# Patient Record
Sex: FEMALE | Race: WHITE | NOT HISPANIC OR LATINO | Employment: FULL TIME | URBAN - METROPOLITAN AREA
[De-identification: names, ages, dates, MRNs, and addresses within clinical notes are randomized per-mention and may not be internally consistent; named-entity substitution may affect disease eponyms.]

---

## 2023-02-09 ENCOUNTER — OFFICE VISIT (OUTPATIENT)
Dept: URGENT CARE | Facility: URGENT CARE | Age: 28
End: 2023-02-09
Payer: COMMERCIAL

## 2023-02-09 VITALS
SYSTOLIC BLOOD PRESSURE: 113 MMHG | TEMPERATURE: 97.9 F | OXYGEN SATURATION: 98 % | DIASTOLIC BLOOD PRESSURE: 73 MMHG | HEART RATE: 68 BPM | WEIGHT: 186 LBS | BODY MASS INDEX: 32.55 KG/M2

## 2023-02-09 DIAGNOSIS — R07.0 THROAT PAIN: Primary | ICD-10-CM

## 2023-02-09 LAB
DEPRECATED S PYO AG THROAT QL EIA: NEGATIVE
GROUP A STREP BY PCR: NOT DETECTED

## 2023-02-09 PROCEDURE — 87651 STREP A DNA AMP PROBE: CPT | Performed by: PHYSICIAN ASSISTANT

## 2023-02-09 PROCEDURE — 99203 OFFICE O/P NEW LOW 30 MIN: CPT | Performed by: PHYSICIAN ASSISTANT

## 2023-02-09 RX ORDER — HYDROXYZINE HYDROCHLORIDE 10 MG/1
TABLET, FILM COATED ORAL
COMMUNITY
Start: 2022-08-30 | End: 2023-06-08

## 2023-02-09 RX ORDER — FAMOTIDINE 20 MG/1
1 TABLET, FILM COATED ORAL
COMMUNITY
Start: 2022-12-11 | End: 2023-03-22

## 2023-02-09 RX ORDER — DULOXETIN HYDROCHLORIDE 30 MG/1
30 CAPSULE, DELAYED RELEASE ORAL DAILY
COMMUNITY
Start: 2023-01-27 | End: 2023-06-08

## 2023-02-09 ASSESSMENT — ENCOUNTER SYMPTOMS
ABDOMINAL PAIN: 0
SORE THROAT: 1
RHINORRHEA: 0
VOMITING: 0
DIARRHEA: 0
COUGH: 0
FEVER: 0

## 2023-02-09 NOTE — PROGRESS NOTES
Assessment & Plan:        ICD-10-CM    1. Throat pain  R07.0 Streptococcus A Rapid Screen w/Reflex to PCR - Clinic Collect     Group A Streptococcus PCR Throat Swab            Plan/Clinical Decision Making:    Patient with acute ST, erythematous throat with exudates.   RST negative.   Rest, fluids. Due to appearance of throat, return to work if PCR negative.   Tylenol/ibuporfen as needed.   Offered covid test, she will monitor symptoms and have test as needed.         Return if symptoms worsen or fail to improve, for in 3-5 days.     At the end of the encounter, I discussed results, diagnosis, medications. Discussed red flags for immediate return to clinic/ER, as well as indications for follow up if no improvement. Patient understood and agreed to plan. Patient was stable for discharge.        Emily Palomraes PA-C on 2/9/2023 at 1:21 PM          Subjective:     HPI:    Fela is a 27 year old female who presents to clinic today for the following health issues:  Chief Complaint   Patient presents with     Urgent Care     Last night throat pain, tonsils swollen, getting worse,   Taking ibuprofen      HPI    Patient complains of ST, swollen tonsils.   Taking ibuprofen.   No covid testing done.     History obtained from the patient.    Review of Systems   Constitutional: Negative for fever.   HENT: Positive for sore throat. Negative for congestion and rhinorrhea.    Respiratory: Negative for cough.    Gastrointestinal: Negative for abdominal pain, diarrhea and vomiting.         Patient Active Problem List   Diagnosis     Other and unspecified nonspecific immunological findings     Polyarticular arthritis     Minocycline adverse reaction        Past Medical History:   Diagnosis Date     Acne     on minocycline 10/2012-1/2014     Allergic rhinitis      Headache(784.0)     tension-type by description     Scoliosis     TLSO bracing for 3 years       Social History     Tobacco Use     Smoking status: Never     Smokeless  tobacco: Never   Substance Use Topics     Alcohol use: Not on file             Objective:     Vitals:    02/09/23 1309   BP: 113/73   Pulse: 68   Temp: 97.9  F (36.6  C)   TempSrc: Oral   SpO2: 98%   Weight: 84.4 kg (186 lb)         Physical Exam   EXAM:   Pleasant, alert, appropriate appearance. NAD.  Head Exam: Normocephalic, atraumatic.  Eye Exam:  PERRLA, EOMI, non icteric/injection.    Ear Exam: TMs grey without bulging. Normal canals.  Normal pinna.  Nose Exam: Normal external nose.    OroPharynx Exam:  Moist mucous membranes. positive erythema, pharynx with exudate, right tonsillar hypertrophy.  Neck/Thyroid Exam:  No LAD.    Chest/Respiratory Exam: CTAB.  Cardiovascular Exam: RRR. No murmur or rubs.      Results:  Results for orders placed or performed in visit on 02/09/23   Streptococcus A Rapid Screen w/Reflex to PCR - Clinic Collect     Status: Normal    Specimen: Throat; Swab   Result Value Ref Range    Group A Strep antigen Negative Negative

## 2023-03-20 ASSESSMENT — ANXIETY QUESTIONNAIRES
IF YOU CHECKED OFF ANY PROBLEMS ON THIS QUESTIONNAIRE, HOW DIFFICULT HAVE THESE PROBLEMS MADE IT FOR YOU TO DO YOUR WORK, TAKE CARE OF THINGS AT HOME, OR GET ALONG WITH OTHER PEOPLE: SOMEWHAT DIFFICULT
4. TROUBLE RELAXING: SEVERAL DAYS
2. NOT BEING ABLE TO STOP OR CONTROL WORRYING: SEVERAL DAYS
5. BEING SO RESTLESS THAT IT IS HARD TO SIT STILL: SEVERAL DAYS
6. BECOMING EASILY ANNOYED OR IRRITABLE: NOT AT ALL
7. FEELING AFRAID AS IF SOMETHING AWFUL MIGHT HAPPEN: SEVERAL DAYS
7. FEELING AFRAID AS IF SOMETHING AWFUL MIGHT HAPPEN: SEVERAL DAYS
1. FEELING NERVOUS, ANXIOUS, OR ON EDGE: SEVERAL DAYS
3. WORRYING TOO MUCH ABOUT DIFFERENT THINGS: SEVERAL DAYS
8. IF YOU CHECKED OFF ANY PROBLEMS, HOW DIFFICULT HAVE THESE MADE IT FOR YOU TO DO YOUR WORK, TAKE CARE OF THINGS AT HOME, OR GET ALONG WITH OTHER PEOPLE?: SOMEWHAT DIFFICULT
GAD7 TOTAL SCORE: 6
GAD7 TOTAL SCORE: 6

## 2023-03-20 ASSESSMENT — ENCOUNTER SYMPTOMS
COUGH: 0
NERVOUS/ANXIOUS: 1
JOINT SWELLING: 0
DIZZINESS: 0
FREQUENCY: 0
FEVER: 0
HEARTBURN: 1
HEMATURIA: 0
CHILLS: 0
DYSURIA: 0
BREAST MASS: 0
HEADACHES: 0
ABDOMINAL PAIN: 0
PARESTHESIAS: 0
MYALGIAS: 0
NAUSEA: 1
SHORTNESS OF BREATH: 0
SORE THROAT: 0
CONSTIPATION: 1
EYE PAIN: 0
WEAKNESS: 0
PALPITATIONS: 0
DIARRHEA: 0
HEMATOCHEZIA: 0
ARTHRALGIAS: 0

## 2023-03-21 ENCOUNTER — OFFICE VISIT (OUTPATIENT)
Dept: OBGYN | Facility: CLINIC | Age: 28
End: 2023-03-21
Attending: NURSE PRACTITIONER
Payer: COMMERCIAL

## 2023-03-21 VITALS
SYSTOLIC BLOOD PRESSURE: 118 MMHG | WEIGHT: 186 LBS | HEART RATE: 80 BPM | BODY MASS INDEX: 34.23 KG/M2 | DIASTOLIC BLOOD PRESSURE: 80 MMHG | HEIGHT: 62 IN

## 2023-03-21 DIAGNOSIS — R10.2 PELVIC PAIN IN FEMALE: ICD-10-CM

## 2023-03-21 DIAGNOSIS — Z30.431 INTRAUTERINE DEVICE SURVEILLANCE: ICD-10-CM

## 2023-03-21 DIAGNOSIS — Z01.419 ENCOUNTER FOR GYNECOLOGICAL EXAMINATION WITHOUT ABNORMAL FINDING: Primary | ICD-10-CM

## 2023-03-21 PROCEDURE — G0101 CA SCREEN;PELVIC/BREAST EXAM: HCPCS | Performed by: NURSE PRACTITIONER

## 2023-03-21 PROCEDURE — 99203 OFFICE O/P NEW LOW 30 MIN: CPT | Mod: 25 | Performed by: NURSE PRACTITIONER

## 2023-03-21 PROCEDURE — G0463 HOSPITAL OUTPT CLINIC VISIT: HCPCS | Performed by: NURSE PRACTITIONER

## 2023-03-21 RX ORDER — LEVONORGESTREL 52 MG/1
1 INTRAUTERINE DEVICE INTRAUTERINE
COMMUNITY
Start: 2021-06-01

## 2023-03-21 ASSESSMENT — PAIN SCALES - GENERAL: PAINLEVEL: NO PAIN (0)

## 2023-03-21 NOTE — LETTER
"3/21/2023       RE: Fela Solorzano  [address]  [place] [address]     Dear Colleague,    Thank you for referring your patient, Fela Solorzano, to the [facility] [place] at [facility]. Please see a copy of my visit note below.    -  Progress Note    SUBJECTIVE:  Fela Solorzano is an 27 year old, , who requests a breast and pelvic exam.    Patient is followed by Shannan Valentine for primary care.    Concerns today include:     1. Pelvic pain: She reports chronic intermittent symptoms of sharp cramping pain and spotting with exercise that comes and goes.  She also endorses post-coital bleeding, dyspareunia, bloating and constipation.Takes Doculax PRN. Per patient, Mirena IUD was placed in  to help with painful periods. Previous provider thought possible endometriosis vs IBS.  Since IUD placement, patient feels \"pain is better in severity but more frequent.\" Pain comes and goes throughout the day; unable to identify patterns. Ranges from a 3/10 -6/10 in intensity. It does interfere with her daily activities.  Denies pelvic or rectal pressure. No known fibroids.     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 Feels safe. Recently moved from [place].      Exercise/ diet: exercises at least 3 x a week, nutrition is ok. Some calcium rich foods.      Sexual hx: denies sexual concerns other than pain noted above; declines STD testing.     Menstrual History:  Menstrual History 10/8/2014 3/21/2023   LAST MENSTRUAL PERIOD 2014 -   Menarche Age - 14   Period Cycle (Days) - alka period on iud   Method of Contraception - Mirena IUD   Reviewed Today - Yes       Last Pap smear: 3/25/2021   History of abnormal Pap smear: NO - age 21-29 PAP every 3 years recommended    Mammogram current: not applicable    HISTORY:  Prescription Medications as of 3/29/2023       Rx Number Disp Refills Start " End Last Dispensed Date Next Fill Date Owning Pharmacy    DULoxetine (CYMBALTA) 30 MG capsule    2023        Sig: Take 30 mg by mouth daily    Class: Historical    Route: Oral    famotidine (PEPCID) 20 MG tablet  180 tablet 3 3/22/2023    [facility] [id number] [place] [address]    Sig: Take 1 tablet (20 mg) by mouth 2 times daily    Class: E-Prescribe    Route: Oral    hydrOXYzine (ATARAX) 10 MG tablet    2022        Class: Historical    levonorgestrel (MIRENA, 52 MG,) 20 MCG/DAY IUD    2021        Si each by Intrauterine route    Class: Historical    Route: Intrauterine    minoxidil (LONITEN) 10 MG tablet  60 tablet 0 3/22/2023    [facility] [id number] [place] [address]    Sig: Take 1/2 tablet once daily    Class: E-Prescribe        Allergies   Allergen Reactions     Minocycline Other (See Comments) and Muscle Pain (Myalgia)     Other reaction(s): Arthralgia  HUT Reaction: Arthralgia    Other reaction(s): Arthralgia       Immunization History   Administered Date(s) Administered     COVID-19 Vaccine 18+ (Moderna) 2020     COVID-19 Vaccine Bivalent Booster 12+ (Pfizer) 2022       OB History    Para Term  AB Living   0 0 0 0 0 0   SAB IAB Ectopic Multiple Live Births   0 0 0 0 0     Past Medical History:   Diagnosis Date     Acne     on minocycline 10/2012-2014     Allergic rhinitis      Depressive disorder      Headache(784.0)     tension-type by description     Scoliosis     TLSO bracing for 3 years     History reviewed. No pertinent surgical history.  Family History   Problem Relation Age of Onset     Depression Mother      Multiple Sclerosis Father      Thyroid Disease Sister      Depression Sister      Depression Sister      SLE Maternal Grandfather      Crohn's Disease Paternal Grandmother      Arthritis Paternal Grandmother      Social History     Socioeconomic History     Marital status: Single     Spouse name: None      "Number of children: None     Years of education: None     Highest education level: None   Tobacco Use     Smoking status: Never     Smokeless tobacco: Never   Vaping Use     Vaping Use: Never used   Substance and Sexual Activity     Alcohol use: Yes     Comment: 1-2 times a month     Drug use: Never     Sexual activity: Yes     Partners: Male     Birth control/protection: I.U.D.       ROS    EXAM:  Blood pressure 118/80, pulse 80, height 1.58 m (5' 2.21\"), weight 84.4 kg (186 lb). Body mass index is 33.8 kg/m .  General appearance: Pleasant female in no acute distress.     BREAST EXAM:  Breast: Without visible skin changes. No dimpling or lesions seen.   Breasts supple, non-tender with palpation, no dominant mass, nodularity, or nipple discharge noted bilaterally. Axillary nodes negative.      PELVIC EXAM:  EG/BUS: Normal genital architecture without lesions, erythema or abnormal secretions; Bartholin's, Urethra, [place]'s normal   Urethral meatus: normal   Urethra: no masses, tenderness, or scarring  Bladder: no masses or tenderness   Vagina: moist, pink, rugae with creamy, white and odorless secretions  Cervix: pink, moist, closed, without lesion; IUD strings extend from cervical os   Uterus: anteverted, and small, smooth, firm, mobile w/o pain  Adnexa: Within normal limits and No masses, nodularity, tenderness    ASSESSMENT:  Encounter Diagnosis   Name Primary?     Pelvic pain in female Yes        PLAN:   Orders Placed This Encounter   Procedures     US Transvaginal Non OB       Plan  1. Breast and pelvic exams without significant findings today. Next pap smear due 3/2024.  2. TV US ordered to assess for correct IUD placement and for structural cause of pain. Counseled pt that endometriosis cannot be diagnosed by ultrasound; confirmation can only be determined by laparoscopy, although there may be signs on ultrasound if lesions on the ovaries. May consider further medical management for endometriosis management, if " no other significant findings on US to change the plan for care.   3. Recommend follow-up visit with GI specialist    Pt will schedule follow-up visit with OB/Gyn to review TV US results and discuss option of laparoscopy for further evaluation of chronic pelvic pain.      Return in one year/PRN for preventive care or problems/concerns.     Verbalized understanding and agreement with visit plan.    I, Maritza Rankin, completed the PFSH and ROS. I then acted as a scribe for PERICO Saleem, for the remainder of the visit.  Maritza Rankin, JDUYN RN NATALIENP DNP Student    I agree with the PFSH and ROS as completed by the PERICO Student, except for changes made by me.  The remainder of the encounter was performed by me and scribed by the PERICO Student.  The scribed note accurately reflects my personal services and decisions made by me.  Jessica Nazario DNP, APRN, PERICO

## 2023-03-21 NOTE — PROGRESS NOTES
"-  Progress Note    SUBJECTIVE:  Fela Solorzano is an 27 year old, , who requests a breast and pelvic exam.    Patient is followed by Shannan Valentine for primary care.    Concerns today include:     1. Pelvic pain: She reports chronic intermittent symptoms of sharp cramping pain and spotting with exercise that comes and goes.  She also endorses post-coital bleeding, dyspareunia, bloating and constipation.Takes Doculax PRN. Per patient, Mirena IUD was placed in  to help with painful periods. Previous provider thought possible endometriosis vs IBS.  Since IUD placement, patient feels \"pain is better in severity but more frequent.\" Pain comes and goes throughout the day; unable to identify patterns. Ranges from a 3/10 -6/10 in intensity. It does interfere with her daily activities.  Denies pelvic or rectal pressure. No known fibroids.     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      Exercise/ diet: exercises at least 3 x a week, nutrition is ok. Some calcium rich foods.      Sexual hx: denies sexual concerns other than pain noted above; declines STD testing.     Menstrual History:  Menstrual History 10/8/2014 3/21/2023   LAST MENSTRUAL PERIOD 2014 -   Menarche Age - 14   Period Cycle (Days) - alka period on iud   Method of Contraception - Mirena IUD   Reviewed Today - Yes       Last Pap smear: 3/25/2021   History of abnormal Pap smear: NO - age 21-29 PAP every 3 years recommended    Mammogram current: not applicable    HISTORY:  Prescription Medications as of 3/29/2023       Rx Number Disp Refills Start End Last Dispensed Date Next Fill Date Owning Pharmacy    DULoxetine (CYMBALTA) 30 MG capsule    2023        Sig: Take 30 mg by mouth daily    Class: Historical    Route: Oral    famotidine (PEPCID) 20 MG tablet  180 tablet 3 3/22/2023    [facility] [id number] [facility] - [place], [place] - [address]    Sig: Take 1 " tablet (20 mg) by mouth 2 times daily    Class: E-Prescribe    Route: Oral    hydrOXYzine (ATARAX) 10 MG tablet    2022        Class: Historical    levonorgestrel (MIRENA, 52 MG,) 20 MCG/DAY IUD    2021        Si each by Intrauterine route    Class: Historical    Route: Intrauterine    minoxidil (LONITEN) 10 MG tablet  60 tablet 0 3/22/2023    [id number] - [place], [place] - [address]    Sig: Take 1/2 tablet once daily    Class: E-Prescribe        Allergies   Allergen Reactions     Minocycline Other (See Comments) and Muscle Pain (Myalgia)     Other reaction(s): Arthralgia  HUT Reaction: Arthralgia    Other reaction(s): Arthralgia       Immunization History   Administered Date(s) Administered     COVID-19 Vaccine 18+ (Moderna) 2020     COVID-19 Vaccine Bivalent Booster 12+ (Pfizer) 2022       OB History    Para Term  AB Living   0 0 0 0 0 0   SAB IAB Ectopic Multiple Live Births   0 0 0 0 0     Past Medical History:   Diagnosis Date     Acne     on minocycline 10/2012-2014     Allergic rhinitis      Depressive disorder      Headache(784.0)     tension-type by description     Scoliosis     TLSO bracing for 3 years     History reviewed. No pertinent surgical history.  Family History   Problem Relation Age of Onset     Depression Mother      Multiple Sclerosis Father      Thyroid Disease Sister      Depression Sister      Depression Sister      SLE Maternal Grandfather      Crohn's Disease Paternal Grandmother      Arthritis Paternal Grandmother      Social History     Socioeconomic History     Marital status: Single     Spouse name: None     Number of children: None     Years of education: None     Highest education level: None   Tobacco Use     Smoking status: Never     Smokeless tobacco: Never   Vaping Use     Vaping Use: Never used   Substance and Sexual Activity     Alcohol use: Yes     Comment: 1-2 times a month     Drug use: Never     Sexual activity:  "Yes     Partners: Male     Birth control/protection: I.U.D.       ROS    EXAM:  Blood pressure 118/80, pulse 80, height 1.58 m (5' 2.21\"), weight 84.4 kg (186 lb). Body mass index is 33.8 kg/m .  General appearance: Pleasant female in no acute distress.     BREAST EXAM:  Breast: Without visible skin changes. No dimpling or lesions seen.   Breasts supple, non-tender with palpation, no dominant mass, nodularity, or nipple discharge noted bilaterally. Axillary nodes negative.      PELVIC EXAM:  EG/BUS: Normal genital architecture without lesions, erythema or abnormal secretions; Bartholin's, Urethra, [place]'s normal   Urethral meatus: normal   Urethra: no masses, tenderness, or scarring  Bladder: no masses or tenderness   Vagina: moist, pink, rugae with creamy, white and odorless secretions  Cervix: pink, moist, closed, without lesion; IUD strings extend from cervical os   Uterus: anteverted, and small, smooth, firm, mobile w/o pain  Adnexa: Within normal limits and No masses, nodularity, tenderness    ASSESSMENT:  Encounter Diagnosis   Name Primary?     Pelvic pain in female Yes        PLAN:   Orders Placed This Encounter   Procedures     US Transvaginal Non OB       Plan  1. Breast and pelvic exams without significant findings today. Next pap smear due 3/2024.  2. TV US ordered to assess for correct IUD placement and for structural cause of pain. Counseled pt that endometriosis cannot be diagnosed by ultrasound; confirmation can only be determined by laparoscopy, although there may be signs on ultrasound if lesions on the ovaries. May consider further medical management for endometriosis management, if no other significant findings on US to change the plan for care.   3. Recommend follow-up visit with GI specialist    Pt will schedule follow-up visit with OB/Gyn to review TV US results and discuss option of laparoscopy for further evaluation of chronic pelvic pain.      Return in one year/PRN for preventive care or " problems/concerns.     Verbalized understanding and agreement with visit plan.    I, Maritza Rankin, completed the PFSH and ROS. I then acted as a scribe for PERICO Saleem, for the remainder of the visit.  JUDY MarxN RN NATALIENP DNP Student    I agree with the PFSH and ROS as completed by the PERICO Student, except for changes made by me.  The remainder of the encounter was performed by me and scribed by the PERICO Student.  The scribed note accurately reflects my personal services and decisions made by me.  Jessica Nazario DNP, APRN, PERICO

## 2023-03-22 ENCOUNTER — OFFICE VISIT (OUTPATIENT)
Dept: FAMILY MEDICINE | Facility: CLINIC | Age: 28
End: 2023-03-22
Payer: COMMERCIAL

## 2023-03-22 VITALS
WEIGHT: 187 LBS | OXYGEN SATURATION: 98 % | RESPIRATION RATE: 16 BRPM | SYSTOLIC BLOOD PRESSURE: 110 MMHG | BODY MASS INDEX: 33.13 KG/M2 | DIASTOLIC BLOOD PRESSURE: 72 MMHG | TEMPERATURE: 98.4 F | HEART RATE: 73 BPM | HEIGHT: 63 IN

## 2023-03-22 DIAGNOSIS — K21.00 GASTROESOPHAGEAL REFLUX DISEASE WITH ESOPHAGITIS WITHOUT HEMORRHAGE: ICD-10-CM

## 2023-03-22 DIAGNOSIS — E55.9 VITAMIN D DEFICIENCY: Primary | ICD-10-CM

## 2023-03-22 DIAGNOSIS — Z78.9 VEGETARIAN DIET: ICD-10-CM

## 2023-03-22 DIAGNOSIS — Z11.4 SCREENING FOR HIV (HUMAN IMMUNODEFICIENCY VIRUS): ICD-10-CM

## 2023-03-22 DIAGNOSIS — Z00.00 HEALTH CARE MAINTENANCE: ICD-10-CM

## 2023-03-22 DIAGNOSIS — Z11.59 NEED FOR HEPATITIS C SCREENING TEST: ICD-10-CM

## 2023-03-22 DIAGNOSIS — R41.840 INATTENTION: ICD-10-CM

## 2023-03-22 DIAGNOSIS — L65.9 ALOPECIA: ICD-10-CM

## 2023-03-22 DIAGNOSIS — Z12.4 CERVICAL CANCER SCREENING: ICD-10-CM

## 2023-03-22 PROCEDURE — 99213 OFFICE O/P EST LOW 20 MIN: CPT | Mod: 25 | Performed by: NURSE PRACTITIONER

## 2023-03-22 PROCEDURE — 99395 PREV VISIT EST AGE 18-39: CPT | Performed by: NURSE PRACTITIONER

## 2023-03-22 RX ORDER — FAMOTIDINE 20 MG/1
20 TABLET, FILM COATED ORAL
Qty: 180 TABLET | Refills: 3 | Status: SHIPPED | OUTPATIENT
Start: 2023-03-22 | End: 2024-09-17

## 2023-03-22 RX ORDER — MINOXIDIL 10 MG/1
TABLET ORAL
Qty: 60 TABLET | Refills: 0 | Status: SHIPPED | OUTPATIENT
Start: 2023-03-22 | End: 2023-06-23

## 2023-03-22 ASSESSMENT — ENCOUNTER SYMPTOMS
ARTHRALGIAS: 0
NAUSEA: 1
SORE THROAT: 0
HEMATURIA: 0
EYE PAIN: 0
SHORTNESS OF BREATH: 0
ABDOMINAL PAIN: 0
CONSTIPATION: 1
MYALGIAS: 0
PARESTHESIAS: 0
FREQUENCY: 0
DIARRHEA: 0
FEVER: 0
NERVOUS/ANXIOUS: 1
JOINT SWELLING: 0
PALPITATIONS: 0
WEAKNESS: 0
HEMATOCHEZIA: 0
HEADACHES: 0
DIZZINESS: 0
DYSURIA: 0
HEARTBURN: 1
CHILLS: 0
BREAST MASS: 0
COUGH: 0

## 2023-03-22 NOTE — PROGRESS NOTES
SUBJECTIVE:   CC: Fela is an 27 year old who presents for preventive health visit.   No flowsheet data found.Patient has been advised of split billing requirements and indicates understanding: Yes  ADHD  Onset: highschool and college   Description:   Easily distracted: YES  Short attention span: YES  Trouble following directions: No   Impulsive behavior: YES   Trouble completing tasks: No  Accompanying Signs & Symptoms:        Change in sleep pattern: no  Irritability at certain times of the day: yes irritable in general  Socially withdrawn: No  Depression symptoms: YES  Anxiety symptoms: YES  History:  Caffeine intake: varying amounts, coffee  Loss of appetite: YES, challenges with get herself food  Healthy diet: YES  Did you have problems in school or with previous employment: No  Family history of ADHD: YES  Have you had an evaluation for ADHD in the past: YES  Do you use alcohol or drugs: No  Therapies tried: medication not used     Healthy Habits:     Getting at least 3 servings of Calcium per day:  Yes    Bi-annual eye exam:  Yes    Dental care twice a year:  NO    Sleep apnea or symptoms of sleep apnea:  Daytime drowsiness    Diet:  Vegetarian/vegan    Frequency of exercise:  2-3 days/week    Duration of exercise:  45-60 minutes    Taking medications regularly:  Yes    Barriers to taking medications:  None    Medication side effects:  None    PHQ-2 Total Score: 2    Additional concerns today:  Yes      Hair loss and questions about oral minodixil.Hair loss since college; has patches in multiple areas on her head.     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 Seeing a psychotherapist to help with managing  Sleep: working on developing a nighttime routine at night 6-7 hour, night person:  6:30am  Alcohol: couple drinks/month  Tobacco: none  Drugs: no    Family history: CAD, MI, CVS, no breast cancer, colon cancer, DMT2  PGF Lupus  MGM: crohns  Mother: fibromyalgia   Father MS    Today's PHQ-2 Score:   PHQ-2 ( 1999 [employer]) 3/21/2023   Q1: Little interest or pleasure in doing things 2   Q2: Feeling down, depressed or hopeless 0   PHQ-2 Score 2   Q1: Little interest or pleasure in doing things -   Q2: Feeling down, depressed or hopeless -   PHQ-2 Score -       Have you ever done Advance Care Planning? (For example, a Health Directive, POLST, or a discussion with a medical provider or your loved ones about your wishes): No, advance care planning information given to patient to review.  Patient declined advance care planning discussion at this time.    Social History     Tobacco Use     Smoking status: Never     Smokeless tobacco: Never   Substance Use Topics     Alcohol use: Yes     Comment: 1-2 times a month       Alcohol Use 3/20/2023   Prescreen: >3 drinks/day or >7 drinks/week? No   No flowsheet data found.  Reviewed orders with patient.  Reviewed health maintenance and updated orders accordingly - Yes  Lab work is in process    Breast Cancer Screening:    Breast CA Risk Assessment (FHS-7) 3/20/2023   Do you have a family history of breast, colon, or ovarian cancer? No / Unknown         Pertinent mammograms are reviewed under the imaging tab.    History of abnormal Pap smear: NO - age 21-29 PAP every 3 years recommended     Reviewed and updated as needed this visit by clinical staff    Allergies  Meds              Reviewed and updated as needed this visit by Provider                 Past Medical History:   Diagnosis Date     Acne     on minocycline 10/2012-1/2014     Allergic rhinitis      Depressive disorder      Headache(784.0)     tension-type by description     Scoliosis     TLSO bracing for 3 years      No past surgical history on file.    Review of Systems   Constitutional: Negative for  "chills and fever.   HENT: Negative for congestion, ear pain, hearing loss and sore throat.    Eyes: Negative for pain and visual disturbance.   Respiratory: Negative for cough and shortness of breath.    Cardiovascular: Negative for chest pain, palpitations and peripheral edema.   Gastrointestinal: Positive for constipation, heartburn and nausea. Negative for abdominal pain, diarrhea and hematochezia.   Breasts:  Negative for tenderness, breast mass and discharge.   Genitourinary: Positive for pelvic pain and vaginal bleeding. Negative for dysuria, frequency, genital sores, hematuria, urgency and vaginal discharge.   Musculoskeletal: Negative for arthralgias, joint swelling and myalgias.   Skin: Negative for rash.   Neurological: Negative for dizziness, weakness, headaches and paresthesias.   Psychiatric/Behavioral: Negative for mood changes. The patient is nervous/anxious.           OBJECTIVE:   /72 (BP Location: Right arm, Patient Position: Sitting, Cuff Size: Adult Large)   Pulse 73   Temp 98.4  F (36.9  C) (Tympanic)   Resp 16   Ht 1.6 m (5' 3\")   Wt 84.8 kg (187 lb)   LMP  (LMP Unknown)   SpO2 98%   BMI 33.13 kg/m    Physical Exam  Constitutional:       Appearance: Normal appearance.   HENT:      Head: Normocephalic.      Right Ear: Tympanic membrane, ear canal and external ear normal.      Left Ear: Tympanic membrane, ear canal and external ear normal.      Nose: Nose normal.      Mouth/Throat:      Mouth: Mucous membranes are moist.      Pharynx: Oropharynx is clear.      Tonsils: 2+ on the right. 2+ on the left.   Eyes:      Extraocular Movements: Extraocular movements intact.      Conjunctiva/sclera: Conjunctivae normal.      Pupils: Pupils are equal, round, and reactive to light.   Neck:      Thyroid: No thyroid mass, thyromegaly or thyroid tenderness.      Trachea: Trachea normal.   Cardiovascular:      Rate and Rhythm: Normal rate and regular rhythm.      Heart sounds: Normal heart sounds. "   Pulmonary:      Effort: Pulmonary effort is normal.      Breath sounds: Normal breath sounds.   Abdominal:      General: Abdomen is flat. Bowel sounds are normal.   Musculoskeletal:         General: Normal range of motion.      Cervical back: Normal range of motion.   Lymphadenopathy:      Cervical: No cervical adenopathy.   Skin:     General: Skin is warm and dry.   Neurological:      Mental Status: She is alert and oriented to person, place, and time.   Psychiatric:         Mood and Affect: Mood normal.         Behavior: Behavior normal.         Thought Content: Thought content normal.         Judgment: Judgment normal.         ASSESSMENT/PLAN:     Problem List Items Addressed This Visit        Digestive    Gastroesophageal reflux disease with esophagitis without hemorrhage     Uses famotidine 20 mg as needed for reflux symptoms         Relevant Medications    famotidine (PEPCID) 20 MG tablet       Musculoskeletal and Integumentary    Alopecia     Patchy, right side hairline pulled back and thinned hair, has an area in the middle of the top of her head. Has seen a dermatologist in the past who told her it was fine. 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 The minoxidil solution will not work her as she has to wear the cap all day. Will do lab testing, TSH, testosterone, iron, ferritin.  Minoxidil 5 mg daily, monitor BP         Relevant Medications    minoxidil (LONITEN) 10 MG tablet    Other Relevant Orders    Testosterone total    TSH with free T4 reflex    Iron and iron binding capacity    Ferritin       Other    Health care maintenance     Pap smear was done with her OB/GYN  Influenza was done last fall at her last job.    Contraception is Mirena    Family history: CAD, MI, CVS, no breast cancer, colon cancer, DMT2  PGF Lupus  MGM: crohns  Mother: fibromyalgia   Father MS           Inattention     Feels like she has many of the symptoms of inattention. Would like neuropsych  "testing to assess it further. neuropsych testing referral placed.          Relevant Orders    Adult Mental Health  Referral    Vegetarian diet     primarily vegetarian but some fish eggs and dairy. Will test vitamin B12. Overall doesn't eat a lot of processed foods.         Relevant Orders    Vitamin B12   Other Visit Diagnoses     Vitamin D deficiency    -  Primary    Relevant Orders    Vitamin D Deficiency    Screening for HIV (human immunodeficiency virus)        Need for hepatitis C screening test        Cervical cancer screening              Patient has been advised of split billing requirements and indicates understanding: Yes      COUNSELING:  Reviewed preventive health counseling, as reflected in patient instructions       Regular exercise       Healthy diet/nutrition      BMI:   Estimated body mass index is 33.13 kg/m  as calculated from the following:    Height as of this encounter: 1.6 m (5' 3\").    Weight as of this encounter: 84.8 kg (187 lb).   Weight management plan: Discussed healthy diet and exercise guidelines      She reports that she has never smoked. She has never used smokeless tobacco.      RASHEEDA Love CNP  [facility]  "

## 2023-03-22 NOTE — PATIENT INSTRUCTIONS
Meditation mumtaz  10% Happier  Waking up to Wilmer Lenz    Please start an Omega 3 fatty acid supplement [id number] mg a day, take with food and  keep the supplement in the fridge. Eat foods high in omega 3 fatty acids sardines, salmon, mackerel, herring, flax, avocados, julius seeds, nuts and seeds.     Tumeric/ Curcumin food and supplement  Quercitin food and supplement    Sleep Hygiene can be defined as the practices and habits one can create to promote good sleep on a regular basis.    About Sleep Hygiene  Sleep hygiene refers to the recommended environmental and behavioral practices designed to promote better quality sleep. It was in the late 1970s that this recommendation was first developed as a way of helping people suffering with mild-to-moderate insomnia.    People who present with insomnia, depression, and other conditions, are assessed by clinicians who offer recommendations based on their assessment.    Recommendations for sleep hygiene tips include the following -  Not exercising either mentally or physically too close to bedtime;  Taking naps at appropriate times;  Establishing a regular sleep schedule;  Reducing worry;  Getting out of bed if sleep doesn t come;  Limiting exposure to light prior to sleep;  Using bed only for sex and sleep;  Avoiding stimulant such as alcohol, caffeine and nicotine in the hours before bedtime;  Creating a dark, comfortable, and peaceful sleep environment.    Sleep Hygiene Tips     Sleep Schedule  One of the recommendations for sleep hygiene relates to the timing of sleep, or allowing enough time for sleep. Adults who get less than seven or eight hours  sleep can experience mental and physical health deficits, and that s why this is one of the top sleep hygiene recommendations.    Generally, these hours of sleep must be achieved at night, and not through napping, because even though naps can be useful after sleep deprivation, naps under normal conditions may be detrimental to  nighttime sleep. Interestingly, it s been discovered that the negative effects of daytime naps on performance and sleep depend on timing and duration, with the least disruptive naps being midday naps. There s also a lot of focus on the importance of having a regular sleep schedule and awakening at the same time every morning.    Activities  Exercise is an interesting topic, because it s an activity that can either inhibit or facilitate sleep quality. It s true that people who exercise regularly achieve better quality sleep than those who don t exercise; however, exercising late in the day can delay the onset of sleep. It s also believed that increasing exposure to natural and bright lights during the daytime, whilst avoiding bright lights before bedtime, can help achieve a sleep-wake schedule which aligns with nature s daily light-dark cycle.    It s recommended that the person engage in relaxing activities before bedtime, because activities that reduce cognitive activity and physiological arousal are more conducive to falling asleep. ASMR is one of the most recent relaxing techniques that are being used. On the other hand, people who continue planning or working on important activities prior to bedtime, or even once in bed, may experience difficulty in falling asleep. That s why good sleep hygiene means not worrying or thinking about emotionally upsetting issues prior to bedtime. If a person becomes frustrated while trying to fall asleep, they re advised to get out of bed and do something relaxing, like reading, for a short period of time. When people experience difficulties falling asleep, it suggested that spending less time in bed results in a deeper and more continuous sleep; and that s why clinicians often recommend that people who struggle to fall asleep not use their bed for any activities other than sleep or sex.    Foods and Other Substances  It s now known that a number of foods and other substances can  disturb sleep due to their stimulant effects and/or the disruption they cause to the digestive system. These substances maintain wakefulness by activating neurobiological systems, and that s why sleep hygiene specialists recommend the avoidance of substances such as -  Nicotine; Alcohol; and Caffeine, including energy drinks, coffee, tea, soft drinks, chocolate, and some pain relievers.    Clinicians will discourage the drinking of alcohol close to bedtime because, even though alcohol can initially induce sleepiness, the arousal created by metabolizing alcohol can significantly fragment and disrupt sleep.    In addition, it s believed that smoking before bed reduces sleep quality by reducing the amount of time spent in deep sleep, thus leading to nocturnal restlessness and sleep fragmentation.    Interestingly, disrupted sleep has also been associated with both hunger, and the consumption of a large meal prior to bedtime (which requires a large effort to metabolize). It may be recommended by your clinician that you eat a light snack before bedtime.And finally, sleep interruptions can be prevented by limiting the intake of liquids before bedtime, resulting in less urinations.    Sleep Environment   The ideal sleep environment is cool, quiet, and very dark. It s been shown that continuous sleep can be interrupted by an uncomfortable room temperature, light, and noises. Your sleep specialist may also recommend that you select a comfortable mattress, pillows, and bedding, in addition to removing a visible bedroom clock. This will prevent the sleeper from focusing on time    Source: American Sleep Association   www.american.sleep.association.org

## 2023-03-24 PROBLEM — L65.9 ALOPECIA: Status: ACTIVE | Noted: 2023-03-24

## 2023-03-24 PROBLEM — Z00.00 HEALTH CARE MAINTENANCE: Status: ACTIVE | Noted: 2023-03-24

## 2023-03-24 PROBLEM — Z78.9 VEGETARIAN DIET: Status: ACTIVE | Noted: 2023-03-24

## 2023-03-24 PROBLEM — K21.00 GASTROESOPHAGEAL REFLUX DISEASE WITH ESOPHAGITIS WITHOUT HEMORRHAGE: Status: ACTIVE | Noted: 2023-03-24

## 2023-03-24 PROBLEM — R41.840 INATTENTION: Status: ACTIVE | Noted: 2023-03-24

## 2023-03-24 NOTE — ASSESSMENT & PLAN NOTE
Patchy, right side hairline pulled back and thinned hair, has an area in the middle of the top of her head. Has seen a dermatologist in the past who told her it was fine. 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 The minoxidil solution will not work her as she has to wear the cap all day. Will do lab testing, TSH, testosterone, iron, ferritin.  Minoxidil 5 mg daily, monitor BP

## 2023-03-24 NOTE — ASSESSMENT & PLAN NOTE
primarily vegetarian but some fish eggs and dairy. Will test vitamin B12. Overall doesn't eat a lot of processed foods.

## 2023-03-24 NOTE — ASSESSMENT & PLAN NOTE
Feels like she has many of the symptoms of inattention. Would like neuropsych testing to assess it further. neuropsych testing referral placed.

## 2023-03-24 NOTE — ASSESSMENT & PLAN NOTE
Pap smear was done with her OB/GYN  Influenza was done last fall at her last job.    Contraception is Mirena    Family history: CAD, MI, CVS, no breast cancer, colon cancer, DMT2  PGF Lupus  MGM: crohns  Mother: fibromyalgia   Father MS

## 2023-04-18 ENCOUNTER — HOSPITAL ENCOUNTER (OUTPATIENT)
Dept: ULTRASOUND IMAGING | Facility: CLINIC | Age: 28
Discharge: HOME | End: 2023-04-18
Attending: NURSE PRACTITIONER
Payer: COMMERCIAL

## 2023-04-18 ENCOUNTER — OFFICE VISIT (OUTPATIENT)
Dept: OBGYN | Facility: CLINIC | Age: 28
End: 2023-04-18
Payer: COMMERCIAL

## 2023-04-18 VITALS
WEIGHT: 190 LBS | DIASTOLIC BLOOD PRESSURE: 77 MMHG | HEART RATE: 80 BPM | BODY MASS INDEX: 33.66 KG/M2 | SYSTOLIC BLOOD PRESSURE: 124 MMHG

## 2023-04-18 DIAGNOSIS — R10.2 PELVIC PAIN IN FEMALE: ICD-10-CM

## 2023-04-18 DIAGNOSIS — R10.2 PELVIC PAIN IN FEMALE: Primary | ICD-10-CM

## 2023-04-18 DIAGNOSIS — R14.0 BLOATING: ICD-10-CM

## 2023-04-18 PROCEDURE — 76830 TRANSVAGINAL US NON-OB: CPT | Mod: 26 | Performed by: RADIOLOGY

## 2023-04-18 PROCEDURE — 76856 US EXAM PELVIC COMPLETE: CPT | Mod: 26 | Performed by: RADIOLOGY

## 2023-04-18 PROCEDURE — 76856 US EXAM PELVIC COMPLETE: CPT

## 2023-04-18 PROCEDURE — G0463 HOSPITAL OUTPT CLINIC VISIT: HCPCS | Mod: 25

## 2023-04-18 PROCEDURE — 99213 OFFICE O/P EST LOW 20 MIN: CPT | Mod: GE | Performed by: OBSTETRICS & GYNECOLOGY

## 2023-04-18 ASSESSMENT — PAIN SCALES - GENERAL: PAINLEVEL: MILD PAIN (2)

## 2023-04-18 NOTE — PROGRESS NOTES
"Gynecology Clinic Visit Note  2023    S: Fela Solorzano is a 27 year old  here today for ultrasound follow up.  Patient seen by Jessica Owen CNM on 3/21/2023 for evaluation of pelvic pain.  At this visit, and again today the patient reports a history of intermittent dull lower pelvic pain that radiates into her hips and knees bilaterally.  This pain is notably worse following exercise and she will occasionally have the same pain during intercourse.  She further describes this pain as a \"uterine cramping\".  Currently feels like this pain occurs 3-4 times a week without any known provocation factors.  She has not been able to find anything that helps to resolve this pain.  She does have a Mirena IUD in place which was to help with her painful periods.  She feels like this helped her pain somewhat but reports over the last 3 to 4 months this pain has gotten significantly worse.  She will occasionally feel pain with defecation.  In addition to the pain she reports the most bothersome symptom is intermittent \"bloating\".  For work-up of this pain the patient was instructed to have a transvaginal ultrasound performed which was performed just prior to this visit.  The patient presents today to discuss the findings from the pelvic ultrasound, in addition to recommendations for further work-up.  She has no other physical complaints today home    Gyn Hx:   No LMP recorded (lmp unknown). (Menstrual status: IUD).   Menses:   Reports irregular menses.  Unable to recall last menstrual cycle.  Contraception:   Mirena IUD  Sexual Activity  Currently sexually active.  No other sexual concerns except intermittent pain with penetrative sexual intercourse  Sexually transmitted disease history:  Reports a history of chlamydia in college, treated.  Subsequent tests for chlamydia have been negative.  PAP smear history:  3/25/2021 NILM   Denies history of abnormal Pap smears    O:   /77   Pulse 80   Wt 86.2 kg " (190 lb)   LMP  (LMP Unknown)   BMI 33.66 kg/m      General: Well-appearing.  No acute distress.  Pleasant.  Cardio: Regular rate, well-perfused  Resp: Nonlabored breathing on room air    Pelvic US 2023:  Findings:   Transabdominal and transvaginal pelvic ultrasound. The uterus measures  7.7 x 4.3 x 2.8 cm. Endometrial stripe is obscured due to indwelling  IUD, appropriate in position (the cornual limbs best seen on image 9).  No uterine mass lesion.     The right ovary measures 2.3 x 2.6 x 2.1 cm with a 2.6 cm dominant  follicle/small cyst. Left ovary measures 1.8 x 2.4 x 1.5 cm. No  adnexal mass lesion or abnormal free fluid.                                                                      Impression: Pelvic ultrasound is within normal limits, including  appropriate position of the IUD.    A/P:  Fela Solorzano is a 27 year old y/o  here today for pelvic ultrasound follow-up.  Patient previously seen by Jessica Nazario CNM for evaluation of pelvic pain (3/24/23).  Pelvic ultrasound was ordered as a part of work-up, and patient obtained today prior to visit.  Pelvic ultrasound was within normal limits with appropriate positioning of Mirena IUD noted.     Long discussion held with patient regarding possible etiologies for her symptoms.  She has been previously told that symptoms may be consistent with endometriosis, and has been previously counseled that diagnosis of endometriosis is performed on pathology via diagnostic laparoscopy or if she were undergoing surgery for some other reason.  Patient reports that she has previously seen gastroenterology several years ago for GI work-up, but has not seen them recently.  We discussed about symptoms described today could potentially be consistent with gynecologic etiology versus GI etiology.  Discussed with patient that while IUD has shown to be helpful for possible endometriosis symptoms, would want to trial either OCPs or Lupron to see if this  could potentially help her symptoms should this be gynecologic in nature given these medications work more systemically versus locally.  Additionally discussed that we could proceed with diagnostic laparoscopy, however this procedure comes with risks including risk of infection, bleeding, damage to surrounding tissue or structures.      Following counseling, patient ultimately decided to first seek GI consultation/evaluation for possible symptoms, particularly given symptoms including bloating, constipation.  GI referral placed today.  Encourage patient to call clinic following GI evaluation or should she change her mind about proceeding with possible trial of medications versus scheduling diagnostic laparoscopy for further evaluation/pursuit of endometriosis as primary diagnosis.    She expressed understanding of this.  All questions were answered and addressed.    Patient staffed with Dr. Negrita Strickland DO, MS  OBGYN Resident, PGY2  [facility]  04/23/2023 3:27 PM     The Patient was seen in [facility] by Dr. Strickland.   I reviewed the history & exam. Assessment and plan were jointly made.   Lamar Rees MD, MPH

## 2023-04-18 NOTE — PATIENT INSTRUCTIONS
Thank you for trusting us with your care!     If you need to contact us for questions about:  Symptoms, Scheduling & Medical Questions; Non-urgent (2-3 day response) Damien message, Urgent (needing response today) [phone] (if after 3:30pm next day response)   Prescriptions: Please call your Pharmacy   Billing: [facility] [phone] or [facility]:[phone]

## 2023-04-30 ENCOUNTER — HEALTH MAINTENANCE LETTER (OUTPATIENT)
Age: 28
End: 2023-04-30

## 2023-05-15 NOTE — TELEPHONE ENCOUNTER
REFERRAL INFORMATION:    Referring Provider:  Avani Strickland MD    Referring Clinic:  [facility]   Reason for Visit/Diagnosis:   Pelvic pain in female   Bloating      FUTURE VISIT INFORMATION:    Appointment Date: 6/8/2023     NOTES STATUS DETAILS   OFFICE NOTE from Referring Provider Internal 4/18/2023 OV with NAGI Strickland   OFFICE NOTE from Other Specialist Care Everywhere [facility] :   4/26/2022 OV with HARPER Upton   9/22/2020, 6/29/2020OV with EDMUND Marlow   2/26/2020 OV with HARPER Minor    [facility]:   8/14/2017, 6/20/2017 OV with Staples, S   HOSPITAL DISCHARGE SUMMARY/  ED VISITS N/A    OPERATIVE REPORT N/A    MEDICATION LIST Internal         ENDOSCOPY  N/A    COLONOSCOPY N/A    ERCP N/A    EUS N/A    STOOL TESTING N/A    PERTINENT LABS N/A    PATHOLOGY REPORTS (RELATED) N/A    IMAGING (CT, MRI, EGD, MRCP, Small Bowel Follow Through/SBT, MR/CT Enterography) Received  [facility] :   4/26/2022 XR ABD     Request sent to [facility] for imaging fax [phone]  Update Disk received & brought to Film room at  to upload on PACS

## 2023-06-08 ENCOUNTER — VIRTUAL VISIT (OUTPATIENT)
Dept: GASTROENTEROLOGY | Facility: CLINIC | Age: 28
End: 2023-06-08
Payer: COMMERCIAL

## 2023-06-08 ENCOUNTER — PRE VISIT (OUTPATIENT)
Dept: GASTROENTEROLOGY | Facility: CLINIC | Age: 28
End: 2023-06-08

## 2023-06-08 ENCOUNTER — TELEPHONE (OUTPATIENT)
Dept: OBGYN | Facility: CLINIC | Age: 28
End: 2023-06-08

## 2023-06-08 DIAGNOSIS — R10.2 PELVIC PAIN IN FEMALE: ICD-10-CM

## 2023-06-08 DIAGNOSIS — K21.00 GASTROESOPHAGEAL REFLUX DISEASE WITH ESOPHAGITIS WITHOUT HEMORRHAGE: ICD-10-CM

## 2023-06-08 DIAGNOSIS — R14.0 BLOATING: ICD-10-CM

## 2023-06-08 DIAGNOSIS — K58.1 IRRITABLE BOWEL SYNDROME WITH CONSTIPATION: Primary | ICD-10-CM

## 2023-06-08 PROCEDURE — 99205 OFFICE O/P NEW HI 60 MIN: CPT | Mod: VID | Performed by: PHYSICIAN ASSISTANT

## 2023-06-08 RX ORDER — DULOXETIN HYDROCHLORIDE 60 MG/1
60 CAPSULE, DELAYED RELEASE ORAL DAILY
COMMUNITY
Start: 2023-04-19 | End: 2023-07-20

## 2023-06-08 NOTE — TELEPHONE ENCOUNTER
Fela called the nurse triage line this morning to let us know she did go see GI about suspected endometriosis and would like to have a follow-up visit with RASHEEDA Saleem.     Fela states GI didn't suspect endometriosis or any other underlying issues other than maybe some minor IBS. Patient was suggested to see a nutritionist by the GI team.    Fela requested this information be routed to Jessica and I also forwarded her call to the  to get scheduled for a follow-up appointment.    Routing message as an FYI to Jessica Nazario.

## 2023-06-08 NOTE — NURSING NOTE
Is the patient currently in the state of [place]? YES    Visit mode:VIDEO    If the visit is dropped, the patient can be reconnected by: VIDEO VISIT: Send to e-mail at: [email or web address]    Will anyone else be joining the visit? NO      How would you like to obtain your AVS? MyChart    Are changes needed to the allergy or medication list? NO    Reason for visit: Consult

## 2023-06-08 NOTE — PATIENT INSTRUCTIONS
It was a pleasure taking care of you today.  I've included a brief summary of our discussion and care plan from today's visit below.  Please review this information with your primary care provider.  _______________________________________________________________________     My recommendations are summarized as follows:     --schedule a lab appointment for blood work   -- try taking miralax regularly to help improve constipation and incomplete evacuation   -- continue pepcid (famotidine) 40mg daily. We can switch this to omeprazole or nexium if reflux symptoms worsening.   -- Have regular meal patterns, avoid large meals, reduce intake of insoluble fibers/fat/caffeine as well as alcohol  -- keep a symptom and food journal  -- Avoid artificial sweeteners including sorbitol, truvia, maltitol, mannitol, xylitol, glycerol and lactilol.   -- Try a low FODMAP diet and avoid foods that have been shown to increase bloating/distension/flatulence include beans, cabbage, onions, celery, carrots, raisins, etc.   -- I have placed a referral to nutritionist to help with an elimination diet. Call [phone] to schedule   -- Other treatment options include the use of enteric coated peppermint (IBGuard) or Simethicone (gas-x)          -    ______________________________________________________________________     How do I schedule labs, imaging studies, or procedures that were ordered in clinic today?      Labs: To schedule lab appointment you can contact your local [facility] or call [phone] to schedule at any convenient [facility] location.     Procedures: If a colonoscopy, upper endoscopy, breath test, esophageal manometry, or pH impedence was ordered today, our endoscopy team will call you to schedule this. If you have not heard from our endoscopy team within a week, please call [phone] to schedule.      Imaging Studies: If you were scheduled for a CT scan, X-ray, MRI, ultrasound, HIDA scan or other  imaging study, please call [phone] to have this scheduled.      Referral: If a referral to another specialty was ordered, expect a phone call or follow instructions above. If you have not heard from anyone regarding your referral in a week, please call our clinic to check the status.      Who do I call with any questions after my visit?  Please be in touch if there are any further questions that arise following today's visit.  There are multiple ways to contact your gastroenterology care team.       During business hours, you may reach a Gastroenterology nurse at [phone]     To schedule or reschedule an appointment, please call [phone].      You can always send a secure message through [employer].  [employer] messages are answered by your nurse or doctor typically within 24 hours.  Please allow extra time on weekends and holidays.       For urgent/emergent questions after business hours, you may reach the on-call GI Fellow by contacting the [facility]  at [phone].     How will I get the results of any tests ordered?    You will receive all of your results.  If you have signed up for [employer], any tests ordered at your visit will be available to you after your physician reviews them.  Typically this takes 1-2 weeks.  If there are urgent results that require a change in your care plan, your physician or nurse will call you to discuss the next steps.       What is [employer]?  [employer] is a secure way for you to access all of your healthcare records from the [facility].  It is a web based computer program, so you can sign on to it from any location.  It also allows you to send secure messages to your care team.  I recommend signing up for [employer] access if you have not already done so and are comfortable with using a computer.       How to I schedule a follow-up visit?  If you did not schedule a follow-up visit today, please call [phone] to schedule a follow-up office  visit.      Cecelia Andrews PA-C  Division of Gastroenterology, Hepatology and Nutrition   [facility]

## 2023-06-08 NOTE — PROGRESS NOTES
GI NEW PATIENT     CC/REFERRING MD:    Shannan Chacko    REASON FOR CONSULTATION:   Referred by Avani Strickland for Consult      HISTORY OF PRESENT ILLNESS:    Fela Solorzano is 28 year old female who presents for evaluation of abdominal bloating.  She was referred by her OB/GYN provider who she has seen recently for pelvic pain.  She has an intermittent dull lower pelvic pain that radiates into her hips and knees bilaterally.  She additionally has some pain during intercourse.  She had a pelvic sono which was negative.  She was told that her symptoms could be related to endometriosis and offered treatment with OCP versus Lupron and/or diagnostic laparoscopy.  Given that she was also having abdominal bloating and constipation she was referred to GI as well.    She reports having diagnosis of IBS and has had troubles with constipation since middle school.  She also has some scoliosis and is unsure of curvature also be contributing to constipation and bloating.  She has tried over-the-counter remedies and even prescription for constipation over the years without much success.  More recently she has found that exercising 3 times a week has helped.  She has on average about 3 soft easy to pass bowel movements a day about [place] stool 4.  She occasionally will have feelings of incomplete evacuation or days with missed bowel movements.  She was previously taking a fiber supplement however she does have a diet high in fruits and veggies and so was advised by her primary care provider to stop the fiber supplement in case it was making symptoms worse.  Her bloating has worsened in the last year.  Bloating is present from the moment she wakes up and lasts the entire day. It does not seem to fluctuate with her diet or with bowel movements.  She has not been able to find anything that seems to make it better or worse.    She also has reflux symptoms which she is controlling with  famotidine 40 mg daily.  She does not have symptoms if she misses a dose or with certain acidic foods    She does have a family hx of crohn's in her paternal grandmother.       Fela  has a past medical history of Acne, Allergic rhinitis, Depressive disorder, Headache(784.0), and Scoliosis.    She  has no past surgical history on file.    She  reports that she has never smoked. She has never used smokeless tobacco. She reports current alcohol use. She reports that she does not use drugs.    Her family history includes Arthritis in her paternal grandmother; Crohn's Disease in her paternal grandmother; Depression in her mother, sister, and sister; Multiple Sclerosis in her father; SLE in her maternal grandfather; Thyroid Disease in her sister.    ALLERGIES:  Minocycline        PERTINENT MEDICATIONS:    Current Outpatient Medications:      DULoxetine (CYMBALTA) 60 MG capsule, Take 60 mg by mouth daily, Disp: , Rfl:      famotidine (PEPCID) 20 MG tablet, Take 1 tablet (20 mg) by mouth 2 times daily, Disp: 180 tablet, Rfl: 3     levonorgestrel (MIRENA, 52 MG,) 20 MCG/DAY IUD, 1 each by Intrauterine route, Disp: , Rfl:      minoxidil (LONITEN) 10 MG tablet, Take 1/2 tablet once daily, Disp: 60 tablet, Rfl: 0      PHYSICAL EXAMINATION:  Constitutional: aaox3, cooperative, pleasant, not dyspneic/diaphoretic, no acute distress      GENERAL: Healthy, alert and no distress  EYES: Eyes grossly normal to inspection.  No discharge or erythema, or obvious scleral/conjunctival abnormalities.  RESP: No audible wheeze, cough, or visible cyanosis.  No visible retractions or increased work of breathing.    SKIN: Visible skin clear. No significant rash, abnormal pigmentation or lesions.  NEURO: Cranial nerves grossly intact.  Mentation and speech appropriate for age.  PSYCH: Mentation appears normal, affect normal/bright, judgement and insight intact, normal speech and appearance well-groomed.          ASSESSMENT/PLAN:    1. Pelvic  pain in female  - Adult GI  Referral - Consult Only    2. Bloating  - Adult GI  Referral - Consult Only  - Tissue transglutaminase sunil IgA and IgG; Future  - IgA; Future  - CRP inflammation; Future  - Erythrocyte sedimentation rate auto; Future  - CBC with Platelets & Differential; Future  - Comprehensive metabolic panel; Future  - Adult GI Clinic Follow-Up Order (Blank); Future    3. Irritable bowel syndrome with constipation  - Nutrition Referral  - Adult GI Clinic Follow-Up Order (Blank); Future    4. Gastroesophageal reflux disease with esophagitis without hemorrhage  - Adult GI Clinic Follow-Up Order (Blank); Future        Fela Solorzano is a 28 year old female who presents for evaluation of abdominal bloating and constipation.  Reviewed that the differential for her bloating is quite broad and from a GI standpoint includes but is not limited to malabsorption (celiac disease), dietary (lactose/fructose intolerance, high FODMAPs diet), functional (IBS & dyspepsia).    She however has not seen fluctuations in bloating with meals or her BM's. Recommended checking labs to include cbc, cmp, crp, esr and celiac screening. There are prior orders from PCP for TSH level. Recommended keeping a symptom and dietary journal to determine if there are any dietary triggers. Referral placed to nutritionist to help with an elimination diet (low FODMAP). Reviewed importance of managing BM's. Continue with exercise, drink plenty of water and high fiber diet. Consider using miralax daily for persistent constipation and or incomplete evacuation. She also has daily reflux which she feels is well controlled with famotidine 40mg daily. Offered PPI for any new or worsening symptoms.  Try gas-x and IB haily.     There is also concern that her symptoms could be related to endometriosis. If work up unrevealing or no improvement in symptoms with above measures then less likely to be GI related. She plans to review this  again with her OBgyn provider and consider treatment vs diagnostic laparoscopy.       Recommendations:   -- schedule a lab appointment for blood work   -- try taking miralax regularly to help improve constipation and incomplete evacuation   -- continue pepcid (famotidine) 40mg daily. We can switch this to omeprazole or nexium if reflux symptoms worsening.   -- Have regular meal patterns, avoid large meals, reduce intake of insoluble fibers/fat/caffeine as well as alcohol  -- keep a symptom and food journal  -- Avoid artificial sweeteners including sorbitol, truvia, maltitol, mannitol, xylitol, glycerol and lactilol.   -- Try a low FODMAP diet and avoid foods that have been shown to increase bloating/distension/flatulence include beans, cabbage, onions, celery, carrots, raisins, etc.   -- I have placed a referral to nutritionist to help with these dietary changes  -- Other treatment options include the use of enteric coated peppermint (IBGuard) or Simethicone (gas-x)      Follow up in 3 months, sooner if needed.     Thank you for this consultation.  It was a pleasure to participate in the care of this patient; please contact us with any further questions.        This note was created with voice recognition software, and while reviewed for accuracy, typos may remain.       75 minutes spent by me on the date of the encounter doing chart review, history and exam, documentation and further activities per the note      Cecelia Andrews PA-C  Division of Gastroenterology, Hepatology and Nutrition   [facility]            Video-Visit Details    Video Start Time: 10:25 AM    Type of service:  Video Visit    Video End Time:10:58 AM    Originating Location (pt. Location): Home    Distant Location (provider location):  Off-site    Platform used for Video Visit: Risa

## 2023-06-12 ENCOUNTER — NURSE TRIAGE (OUTPATIENT)
Dept: FAMILY MEDICINE | Facility: CLINIC | Age: 28
End: 2023-06-12
Payer: COMMERCIAL

## 2023-06-12 NOTE — TELEPHONE ENCOUNTER
Pt called to request an appt, she has been having frequent throbbing headaches for the last month . They are usually frontal and behind the eyes, no nausea or fever. They happen at different times of the day, pain is 8/10 at the worsetibuprofen sometimes helps, lying down in a dark room reduces pain. Pt is requesting appt on 6/23 as she is off work that day, appt is scheduled.Elvia Sung RN       Reason for Disposition    Headache is a chronic symptom (recurrent or ongoing AND lasting > 4 weeks)    Additional Information    Negative: Difficult to awaken or acting confused (e.g., disoriented, slurred speech)    Negative: Weakness of the face, arm or leg on one side of the body and new-onset    Negative: Numbness of the face, arm or leg on one side of the body and new-onset    Negative: Loss of speech or garbled speech and new-onset    Negative: Passed out (i.e., fainted, collapsed and was not responding)    Negative: Sounds like a life-threatening emergency to the triager    Negative: Followed a head injury within last 3 days    Negative: Traumatic Brain Injury (TBI) is suspected    Negative: Sinus pain of forehead and yellow or green nasal discharge    Negative: Pregnant    Negative: Unable to walk without falling    Negative: Stiff neck (can't touch chin to chest)    Negative: Possibility of carbon monoxide exposure    Negative: SEVERE headache, states 'worst headache' of life    Negative: SEVERE headache, sudden-onset (i.e., reaching maximum intensity within seconds to 1 hour)    Negative: Severe pain in one eye    Negative: Loss of vision or double vision  (Exception: Same as prior migraines.)    Negative: Patient sounds very sick or weak to the triager    Negative: Fever > 103 F (39.4 C)    Negative: Fever > 100.0 F (37.8 C) and has diabetes mellitus or a weak immune system (e.g., HIV positive, cancer chemotherapy, organ transplant, splenectomy, chronic steroids)    Negative: Headache started during exertion  (e.g., sex, strenuous exercise, heavy lifting)    Negative: Unexplained headache that is present > 24 hours    Negative: New headache and age > 50    Negative: SEVERE headache (e.g., excruciating) and has had severe headaches before    Negative: SEVERE headache and not relieved by pain meds    Negative: SEVERE headache and vomiting    Negative: SEVERE headache and fever    Negative: New headache and weak immune system (e.g., HIV positive, cancer chemo, splenectomy, organ transplant, chronic steroids)    Negative: Fever present > 3 days (72 hours)    Negative: Patient wants to be seen    Protocols used: HEADACHE-A-OH

## 2023-06-14 ENCOUNTER — LAB (OUTPATIENT)
Dept: LAB | Facility: CLINIC | Age: 28
End: 2023-06-14
Payer: COMMERCIAL

## 2023-06-14 DIAGNOSIS — E55.9 VITAMIN D DEFICIENCY: ICD-10-CM

## 2023-06-14 DIAGNOSIS — Z78.9 VEGETARIAN DIET: ICD-10-CM

## 2023-06-14 DIAGNOSIS — L65.9 ALOPECIA: ICD-10-CM

## 2023-06-14 DIAGNOSIS — R14.0 BLOATING: ICD-10-CM

## 2023-06-14 LAB
ALBUMIN SERPL BCG-MCNC: 4.7 G/DL (ref 3.5–5.2)
ALP SERPL-CCNC: 69 U/L (ref 35–104)
ALT SERPL W P-5'-P-CCNC: 16 U/L (ref 0–50)
ANION GAP SERPL CALCULATED.3IONS-SCNC: 11 MMOL/L (ref 7–15)
AST SERPL W P-5'-P-CCNC: 23 U/L (ref 0–45)
BASOPHILS # BLD AUTO: 0 10E3/UL (ref 0–0.2)
BASOPHILS NFR BLD AUTO: 1 %
BILIRUB SERPL-MCNC: 0.3 MG/DL
BUN SERPL-MCNC: 14.5 MG/DL (ref 6–20)
CALCIUM SERPL-MCNC: 9.8 MG/DL (ref 8.6–10)
CHLORIDE SERPL-SCNC: 104 MMOL/L (ref 98–107)
CREAT SERPL-MCNC: 0.82 MG/DL (ref 0.51–0.95)
CRP SERPL-MCNC: <3 MG/L
DEPRECATED CALCIDIOL+CALCIFEROL SERPL-MC: 85 UG/L (ref 20–75)
DEPRECATED HCO3 PLAS-SCNC: 26 MMOL/L (ref 22–29)
EOSINOPHIL # BLD AUTO: 0.1 10E3/UL (ref 0–0.7)
EOSINOPHIL NFR BLD AUTO: 3 %
ERYTHROCYTE [DISTWIDTH] IN BLOOD BY AUTOMATED COUNT: 13.1 % (ref 10–15)
ERYTHROCYTE [SEDIMENTATION RATE] IN BLOOD BY WESTERGREN METHOD: 7 MM/HR (ref 0–20)
FERRITIN SERPL-MCNC: 11 NG/ML (ref 6–175)
GFR SERPL CREATININE-BSD FRML MDRD: >90 ML/MIN/1.73M2
GLUCOSE SERPL-MCNC: 81 MG/DL (ref 70–99)
HCT VFR BLD AUTO: 36.4 % (ref 35–47)
HGB BLD-MCNC: 12.3 G/DL (ref 11.7–15.7)
IMM GRANULOCYTES # BLD: 0 10E3/UL
IMM GRANULOCYTES NFR BLD: 0 %
IRON BINDING CAPACITY (ROCHE): 418 UG/DL (ref 240–430)
IRON SATN MFR SERPL: 10 % (ref 15–46)
IRON SERPL-MCNC: 42 UG/DL (ref 37–145)
LYMPHOCYTES # BLD AUTO: 1.8 10E3/UL (ref 0.8–5.3)
LYMPHOCYTES NFR BLD AUTO: 45 %
MCH RBC QN AUTO: 26.8 PG (ref 26.5–33)
MCHC RBC AUTO-ENTMCNC: 33.8 G/DL (ref 31.5–36.5)
MCV RBC AUTO: 79 FL (ref 78–100)
MONOCYTES # BLD AUTO: 0.3 10E3/UL (ref 0–1.3)
MONOCYTES NFR BLD AUTO: 7 %
NEUTROPHILS # BLD AUTO: 1.7 10E3/UL (ref 1.6–8.3)
NEUTROPHILS NFR BLD AUTO: 44 %
NRBC # BLD AUTO: 0 10E3/UL
NRBC BLD AUTO-RTO: 0 /100
PLATELET # BLD AUTO: 233 10E3/UL (ref 150–450)
POTASSIUM SERPL-SCNC: 3.6 MMOL/L (ref 3.4–5.3)
PROT SERPL-MCNC: 7.5 G/DL (ref 6.4–8.3)
RBC # BLD AUTO: 4.59 10E6/UL (ref 3.8–5.2)
SODIUM SERPL-SCNC: 141 MMOL/L (ref 136–145)
TSH SERPL DL<=0.005 MIU/L-ACNC: 4.09 UIU/ML (ref 0.3–4.2)
VIT B12 SERPL-MCNC: 333 PG/ML (ref 232–1245)
WBC # BLD AUTO: 3.9 10E3/UL (ref 4–11)

## 2023-06-14 PROCEDURE — 86140 C-REACTIVE PROTEIN: CPT | Performed by: PATHOLOGY

## 2023-06-14 PROCEDURE — 82728 ASSAY OF FERRITIN: CPT | Performed by: PATHOLOGY

## 2023-06-14 PROCEDURE — 85652 RBC SED RATE AUTOMATED: CPT | Performed by: PATHOLOGY

## 2023-06-14 PROCEDURE — 80050 GENERAL HEALTH PANEL: CPT | Performed by: PATHOLOGY

## 2023-06-14 PROCEDURE — 83550 IRON BINDING TEST: CPT | Performed by: PATHOLOGY

## 2023-06-14 PROCEDURE — 82784 ASSAY IGA/IGD/IGG/IGM EACH: CPT | Performed by: PHYSICIAN ASSISTANT

## 2023-06-14 PROCEDURE — 82607 VITAMIN B-12: CPT | Performed by: NURSE PRACTITIONER

## 2023-06-14 PROCEDURE — 82306 VITAMIN D 25 HYDROXY: CPT | Performed by: NURSE PRACTITIONER

## 2023-06-14 PROCEDURE — 84403 ASSAY OF TOTAL TESTOSTERONE: CPT | Performed by: NURSE PRACTITIONER

## 2023-06-14 PROCEDURE — 36415 COLL VENOUS BLD VENIPUNCTURE: CPT | Performed by: PATHOLOGY

## 2023-06-14 PROCEDURE — 86364 TISS TRNSGLTMNASE EA IG CLAS: CPT | Performed by: PHYSICIAN ASSISTANT

## 2023-06-14 PROCEDURE — 83540 ASSAY OF IRON: CPT | Performed by: PATHOLOGY

## 2023-06-15 LAB
IGA SERPL-MCNC: 141 MG/DL (ref 84–499)
TTG IGA SER-ACNC: 0.5 U/ML
TTG IGG SER-ACNC: <0.6 U/ML

## 2023-06-18 LAB — TESTOST SERPL-MCNC: 11 NG/DL (ref 8–60)

## 2023-06-23 ENCOUNTER — OFFICE VISIT (OUTPATIENT)
Dept: FAMILY MEDICINE | Facility: CLINIC | Age: 28
End: 2023-06-23
Payer: COMMERCIAL

## 2023-06-23 VITALS
TEMPERATURE: 99.3 F | RESPIRATION RATE: 16 BRPM | DIASTOLIC BLOOD PRESSURE: 80 MMHG | HEART RATE: 77 BPM | WEIGHT: 191 LBS | BODY MASS INDEX: 33.84 KG/M2 | OXYGEN SATURATION: 98 % | SYSTOLIC BLOOD PRESSURE: 120 MMHG | HEIGHT: 63 IN

## 2023-06-23 DIAGNOSIS — L65.9 ALOPECIA: ICD-10-CM

## 2023-06-23 DIAGNOSIS — G43.709 CHRONIC MIGRAINE WITHOUT AURA WITHOUT STATUS MIGRAINOSUS, NOT INTRACTABLE: Primary | ICD-10-CM

## 2023-06-23 PROCEDURE — 99214 OFFICE O/P EST MOD 30 MIN: CPT | Performed by: NURSE PRACTITIONER

## 2023-06-23 RX ORDER — BUPROPION HYDROCHLORIDE 150 MG/1
150 TABLET ORAL EVERY MORNING
COMMUNITY
End: 2024-01-19 | Stop reason: DRUGHIGH

## 2023-06-23 RX ORDER — SUMATRIPTAN 50 MG/1
50 TABLET, FILM COATED ORAL
Qty: 10 TABLET | Refills: 0 | Status: SHIPPED | OUTPATIENT
Start: 2023-06-23 | End: 2023-07-20

## 2023-06-23 RX ORDER — MINOXIDIL 10 MG/1
TABLET ORAL
Qty: 60 TABLET | Refills: 0 | Status: SHIPPED | OUTPATIENT
Start: 2023-06-23 | End: 2023-06-23

## 2023-06-23 RX ORDER — MINOXIDIL 10 MG/1
TABLET ORAL
Qty: 45 TABLET | Refills: 3 | Status: SHIPPED | OUTPATIENT
Start: 2023-06-23 | End: 2023-11-02

## 2023-06-23 ASSESSMENT — PAIN SCALES - GENERAL: PAINLEVEL: MODERATE PAIN (4)

## 2023-06-23 ASSESSMENT — ENCOUNTER SYMPTOMS: HEADACHES: 1

## 2023-06-23 NOTE — PATIENT INSTRUCTIONS
What is the home Epley maneuver?  The home Epley maneuver is a type of exercise help that helps to treat the symptoms of benign paroxysmal positional vertigo (BPPV). You can do this exercise at home.    BPPV is caused by a problem in your inner ear. Your semicircular canals are found inside your ear. They detect motion and send this information to your brain. The utricle is a nearby part of the ear. It contains calcium crystals (canaliths) that help it detect movement.    Sometimes these crystals detach from the utricle and end up inside the semicircular canals. When these crystals move inside the canals, they may send incorrect signals to your brain about your position. This can make you feel like the world is spinning. This is called vertigo.    Dr. John Epley designed a series of movements to dislodge the crystals from the semicircular canals. These movements bring the crystals back to the utricle, where they belong. This treats the symptoms of vertigo.    The original Epley maneuver was designed to be done with a healthcare provider. The home Epley maneuver is similar. These are a series of movements that you can do without help, in your own home.    Why might I need the home Epley maneuver?  You may need to try the home Epley maneuver if you have symptoms of BPPV. In BPPV, vertigo may come on with certain head movements. It may last for up to a minute. These symptoms may be more frequent at times. You may also have nausea and vomiting.    Often BPPV happens without any known cause. Sometimes there is a cause. Causes of BPPV can include:    A head injury  Problems after ear surgery  The home Epley maneuver is safe and inexpensive. It often works well to treat the symptoms of BPPV.    Your healthcare provider may suggest the home Epley maneuver if your health history and physical exam support that you have BBPV.  Your healthcare provider may also suggest that you see a vestibular therapist for treatment.    Your  healthcare provider may first do the original Epley maneuver in his or her office. He or she may teach you the home Epley maneuver. You may need to do the home Epley maneuver if you still have symptoms after you leave your healthcare provider s office. It may also be useful to know how to do this maneuver if your BPPV comes back after a few months or years.    What are the risks of home Epley maneuver?  The home Epley maneuver is safe. It may be helpful to have someone at home with you while you go through the movements. This can give you peace of mind in case your vertigo gets worse in the middle of the treatment.    People with health conditions that limit their ability to move may not be able to do the home Epley maneuver safely. These issues can include neck or back disease, vascular conditions, and retinal detachment. Ask your healthcare provider if the home Epley maneuver is safe for you.    How do I get ready for the home Epley maneuver?  You can do the home Epley maneuver on a bed. You start by sitting on the bed. You need to have a pillow in place so that when you lie back it will be under your shoulders.    What happens during the home Epley maneuver?  You may find it helpful to watch a video of the home Epley maneuver first. Or read a brochure with pictures.    Your healthcare provider will tell how often to do this procedure. He or she may ask you to do it 3 times a day until your symptoms have been gone for 24 hours. Your healthcare provider will also tell if your right or left ear is causing your symptoms.    Follow these steps if the problem is with your right ear:    Start by sitting on a bed.  Turn your head 45 degrees to the right.  Quickly lie back, keeping your head turned. Your shoulders should now be on the pillow, and your head should be reclined. Wait 30 seconds.  Turn your head 90 degrees to the left, without raising it. Your head will now be looking 45 degrees to the left. Wait another 30  seconds.  Turn your head and body another 90 degrees to the left, into the bed. Wait another 30 seconds.  Sit up on the left side.  Follow these steps if the problem is with your left ear:    Start by sitting on a bed.  Turn your head 45 degrees to the left.  Quickly lie back, keeping your head turned. Your shoulders should now be on the pillow, and your head should be reclined. Wait 30 seconds.  Turn your head 90 degrees to the right, without raising it. Your head will now be looking 45 degrees to the right. Wait another 30 seconds.  Turn your head and body another 90 degrees to the right, into the bed. Wait another 30 seconds.  Sit up on the right side.  What happens after the home Epley maneuver?  Most people say their symptoms go away right after they do the maneuver. In some cases, it may take a few times for the procedure to work. Some people may have mild symptoms for a couple of weeks. Once your symptoms go away, there is no need to keep doing the maneuver.    Your healthcare provider may suggest avoiding certain positions for a while after your symptoms have gone away. For instance, you may need to sleep propped up on 2 pillows, to keep your neck from extending straight.    If you still have symptoms after doing the home Epley maneuver, call your healthcare provider. You may not be doing the maneuver the right way. Or you may have another problem that s causing your symptoms of vertigo. The home Epley maneuver only works to treat vertigo from BPPV. But many other conditions can cause vertigo.    You should be able to be active after doing the home Epley maneuver. Make sure your vertigo has really gone away before doing anything dangerous, such as driving.    With the help of the home Epley maneuver, your vertigo may go away for weeks or even years. BPPV often comes back, though. This might happen if another calcium crystal ends up in your semicircular canals. If your vertigo comes back, do home Epley  maneuver again to see if your symptoms go away. If the maneuver doesn t work, call your healthcare provider.    Next steps

## 2023-06-23 NOTE — ASSESSMENT & PLAN NOTE
Has had increased growth of hair on head with Minoxidil 5 mg but also has had increase in hair growth on arms, hands, and face which she is deciding if she continues with it or not. Prescription given

## 2023-06-23 NOTE — PROGRESS NOTES
Assessment & Plan   Problem List Items Addressed This Visit        Nervous and Auditory    Chronic migraine without aura without status migrainosus, not intractable - Primary     DD: migraine, tension, rebound  Has a constant headache for the past month; at times worse than others. Associated symptoms include nausea, pounding  Family history of migraines in Mom and sister  Plan: trial Sumatriptan 50 mg with onset of headache  Consider elimination diet for gluten free for 3-4 weeks to see if improvement in headaches. Consider topiramate or Maxalt if the future if symptoms don't improve with sumatriptan and dietary change.         Relevant Medications    buPROPion (WELLBUTRIN XL) 150 MG 24 hr tablet    SUMAtriptan (IMITREX) 50 MG tablet       Musculoskeletal and Integumentary    Alopecia     Has had increased growth of hair on head with Minoxidil 5 mg but also has had increase in hair growth on arms, hands, and face which she is deciding if she continues with it or not. Prescription given         Relevant Medications    minoxidil (LONITEN) 10 MG tablet        FUTURE APPOINTMENTS:       - Follow-up for annual visit or as needed    [facility] JAMAL Chahal is a 28 year old, presenting for the following health issues:  Headache         No data to display              Headaches; more frequent headaches almost a month. Really bad can be pounding with any movement of her head. I the right eye and forehead. When it lower in the general forehead and between eyes. No vision changes. Some nausea intermittently in the past week.it does get better when she goes into a dark room and rest it off.  Reserves tylenol/ibuprofen for worse days not most days. Had a similar flare up of headaches episodes for a period of time while she was in college. Wakes up with the headache and can still function.     Mom has history of bad migraines, tried Botox didn't work. She thinks that  "she just had ablation to nerves which helped.    Sister has some migraines.         Headache     History of Present Illness       Headaches:   Since the patient's last clinic visit, headaches are: worsened  The patient is getting headaches:  Multiple times a week, almost every day  She is not able to do normal daily activities when she has a migraine.  The patient is taking the following rescue/relief medications:  Ibuprofen (Advil, Motrin)   Patient states \"The relief is inconsistent\" from the rescue/relief medications.   The patient is taking the following medications to prevent migraines:  No medications to prevent migraines  In the past 4 weeks, the patient has gone to an Urgent Care or Emergency Room 0 times times due to headaches.    She eats 2-3 servings of fruits and vegetables daily.She consumes 0 sweetened beverage(s) daily.She exercises with enough effort to increase her heart rate 20 to 29 minutes per day.  She exercises with enough effort to increase her heart rate 4 days per week.   She is taking medications regularly.     Review of Systems   Neurological: Positive for headaches.          Objective    /80 (BP Location: Right arm, Patient Position: Sitting, Cuff Size: Adult Large)   Pulse 77   Temp 99.3  F (37.4  C) (Tympanic)   Resp 16   Ht 1.6 m (5' 3\")   Wt 86.6 kg (191 lb)   SpO2 98%   BMI 33.83 kg/m    Body mass index is 33.83 kg/m .  Physical Exam  Constitutional:       Appearance: Normal appearance.   HENT:      Head: Normocephalic.      Comments: Has hair growth at her temples, on arms.      Right Ear: Tympanic membrane and ear canal normal.      Left Ear: Tympanic membrane and ear canal normal.      Mouth/Throat:      Mouth: Mucous membranes are moist.      Pharynx: Oropharynx is clear.   Eyes:      Extraocular Movements: Extraocular movements intact.      Conjunctiva/sclera: Conjunctivae normal.      Pupils: Pupils are equal, round, and reactive to light.   Pulmonary:      Effort: " Pulmonary effort is normal. No respiratory distress.   Musculoskeletal:      Cervical back: Normal range of motion.   Skin:     General: Skin is warm and dry.   Neurological:      General: No focal deficit present.      Mental Status: She is alert and oriented to person, place, and time.      Cranial Nerves: Cranial nerves 2-12 are intact.      Sensory: Sensation is intact.      Motor: Motor function is intact.      Coordination: Coordination is intact.      Gait: Gait is intact.      Deep Tendon Reflexes: Reflexes are normal and symmetric.   Psychiatric:         Mood and Affect: Mood normal.         Behavior: Behavior normal.         Thought Content: Thought content normal.         Judgment: Judgment normal.

## 2023-06-23 NOTE — ASSESSMENT & PLAN NOTE
DD: migraine, tension, rebound  Has a constant headache for the past month; at times worse than others. Associated symptoms include nausea, pounding  Family history of migraines in Mom and sister  Plan: trial Sumatriptan 50 mg with onset of headache  Consider elimination diet for gluten free for 3-4 weeks to see if improvement in headaches. Consider topiramate or Maxalt if the future if symptoms don't improve with sumatriptan and dietary change.

## 2023-07-02 ENCOUNTER — MYC MEDICAL ADVICE (OUTPATIENT)
Dept: FAMILY MEDICINE | Facility: CLINIC | Age: 28
End: 2023-07-02
Payer: COMMERCIAL

## 2023-07-02 DIAGNOSIS — G43.709 CHRONIC MIGRAINE WITHOUT AURA WITHOUT STATUS MIGRAINOSUS, NOT INTRACTABLE: Primary | ICD-10-CM

## 2023-07-03 RX ORDER — RIZATRIPTAN BENZOATE 10 MG/1
10 TABLET, ORALLY DISINTEGRATING ORAL
Qty: 30 TABLET | Refills: 0 | Status: SHIPPED | OUTPATIENT
Start: 2023-07-03 | End: 2023-09-28

## 2023-07-19 NOTE — PROGRESS NOTES
Subjective   Fela is a 28 year old , here today for follow up on pelvic pain.     She was initially seen in 3/2023. Her symptoms then were chronic intermittent sharp & cramping pelvic pain and spotting with exercise that comes and goes. She was also experiencing post-coital bleeding, dyspareunia, bloating and constipation. She did report slight improvement in the severity of her pain with Mirena IUD placement, but the pain continues to interfere with her daily life. She had a pelvic US done at that visit, which showed normal pelvic and correct IUD placement.      She was seen in 2023, for a follow up on her pelvic US. In this visit she endorsed similar symptoms regarding to her pelvic pain. She did note in this visit that the IUD placement somewhat helped but in the last 3-4 months, the pain has gotten significantly worse. She endorsed pain with defecation and intermittent bloating. In this visit she was counseled on her symptoms being consistent with endometriosis. She was given the option to trial OCPs or Lupron to see if her symptoms improve. Pt was interested in having a GI work-up to rule out GI etiology given that she was experiencing bloating and constipation. A GI referral was placed.     Today Fela is here; she has completed all but one of the tests in her GI evaluation and so far there has been no explanation for her symptoms. She still considering completing the hydrogen breath test and is working on finding a time that works with her schedule. She continues to experience severe pelvic pain (chronic) that comes and goes throughout the day. Pain is mostly localized in the center, but will have occasional sharp pain in the sides of her pelvic. Characterizes her pain as cramping and sometimes feels like a twisting aching pain. She has occasional bleeding during intercourse and after, and pain with deep penetration. She is having rectal pain with her periods. She continues to have bloating. 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  She denies dysuria, urinary frequency and urgency. Denies any vaginal discharge, itching, or odor. She has previously declined STD testing.    Pertinent labs:  - CMP normal 6/2023  - Pap smear: 3/2021 NIL  - CBC WNL 5/2023 except for WBC of 3.9    Pelvic US 4/18/2023:  Impression: Pelvic ultrasound is within normal limits, including  appropriate position of the IUD.    Past Medical History:   Diagnosis Date     Acne     on minocycline 10/2012-1/2014     Allergic rhinitis      Depressive disorder      Headache(784.0)     tension-type by description     Scoliosis     TLSO bracing for 3 years     History reviewed. No pertinent surgical history.  Family History   Problem Relation Age of Onset     Depression Mother      Multiple Sclerosis Father      Thyroid Disease Sister      Depression Sister      Depression Sister      SLE Maternal Grandfather      Crohn's Disease Paternal Grandmother      Arthritis Paternal Grandmother      Current Outpatient Medications   Medication     buPROPion (WELLBUTRIN XL) 150 MG 24 hr tablet     DULoxetine (CYMBALTA) 30 MG capsule     famotidine (PEPCID) 20 MG tablet     levonorgestrel (MIRENA, 52 MG,) 20 MCG/DAY IUD     minoxidil (LONITEN) 10 MG tablet     rizatriptan (MAXALT-MLT) 10 MG ODT     No current facility-administered medications for this visit.     Allergies   Allergen Reactions     Minocycline Other (See Comments) and Muscle Pain (Myalgia)     Other reaction(s): Arthralgia  HUT Reaction: Arthralgia    Other reaction(s): Arthralgia         Objective:  /80   Pulse 78   Ht 1.58 m (5' 2.21\")   Wt 86.2 kg (190 lb)   BMI 34.52 kg/m     EXAM:  Constitutional: healthy, alert and no distress   Respiratory: normal non-labored breathing.   Psychiatric: mentation appears normal and affect normal/bright  Musculoskeletal: extremities normal- no gross " deformities noted, gait normal and normal muscle tone    Assessment/Plan:  1. Pelvic pain in female  Acknowledged how difficult and long this process has been on finding out the cause of her chronic pelvic pain. It is reassuring that all of her GI work up came back negative. She had been recommended to see a nutritionist (per review of previous notes), which she may consider.  Recommended for Fela to complete her hydrogen breath test for a thorough GI work up. Discussed possible gyn causes for her symptoms, with most likely diagnosis being endometriosis. The only definitive way to diagnose endometriosis is through laparoscopy, which is a procedure that carries some risks. The other approach we discussed is to trial OCPs (in addition to her LNG-IUD) and evaluate if her symptoms do improve. She has a hx of migraines withOUT aura so would be a candidate for estrogen. Alternatively, she could trial a GnRH agonist (Lupron); reviewed common SE.   If symptoms improve, it would suggest endometriosis as a likely etiology. She is currently in the process of switching her antidepressant medication and is not interested in starting another new medication at this time. She is considering scheduling for a laparoscopy procedure, but desires for this to be done at a location other than the [facility] [place], as she would like care outside of her work area. She will contact her insurance to understand where she has insurance coverage and contact writer via [email or web address] once she decides on her preferred approach.     Patient left, stable. She expressed understanding and agreement with the plan for care.    Jessica Nazario, DNP, APRN, WHNP

## 2023-07-20 ENCOUNTER — OFFICE VISIT (OUTPATIENT)
Dept: OBGYN | Facility: CLINIC | Age: 28
End: 2023-07-20
Attending: NURSE PRACTITIONER
Payer: COMMERCIAL

## 2023-07-20 VITALS
BODY MASS INDEX: 34.96 KG/M2 | SYSTOLIC BLOOD PRESSURE: 125 MMHG | WEIGHT: 190 LBS | HEIGHT: 62 IN | DIASTOLIC BLOOD PRESSURE: 80 MMHG | HEART RATE: 78 BPM

## 2023-07-20 DIAGNOSIS — R10.2 PELVIC PAIN IN FEMALE: Primary | ICD-10-CM

## 2023-07-20 PROCEDURE — G0463 HOSPITAL OUTPT CLINIC VISIT: HCPCS | Performed by: NURSE PRACTITIONER

## 2023-07-20 PROCEDURE — 99214 OFFICE O/P EST MOD 30 MIN: CPT | Performed by: NURSE PRACTITIONER

## 2023-07-20 RX ORDER — DULOXETIN HYDROCHLORIDE 30 MG/1
20 CAPSULE, DELAYED RELEASE ORAL
COMMUNITY
End: 2024-03-27

## 2023-07-20 ASSESSMENT — PAIN SCALES - GENERAL: PAINLEVEL: MILD PAIN (2)

## 2023-07-20 NOTE — LETTER
2023       RE: Fela Solorzano  [address]  [place] [address]     Dear Colleague,    Thank you for referring your patient, Fela Solorzano, to the [facility] [place] at [facility]. Please see a copy of my visit note below.    Subjective   Fela is a 28 year old , here today for follow up on pelvic pain.     She was initially seen in 3/2023. Her symptoms then were chronic intermittent sharp & cramping pelvic pain and spotting with exercise that comes and goes. She was also experiencing post-coital bleeding, dyspareunia, bloating and constipation. She did report slight improvement in the severity of her pain with Mirena IUD placement, but the pain continues to interfere with her daily life. She had a pelvic US done at that visit, which showed normal pelvic and correct IUD placement.      She was seen in 2023, for a follow up on her pelvic US. In this visit she endorsed similar symptoms regarding to her pelvic pain. She did note in this visit that the IUD placement somewhat helped but in the last 3-4 months, the pain has gotten significantly worse. She endorsed pain with defecation and intermittent bloating. In this visit she was counseled on her symptoms being consistent with endometriosis. She was given the option to trial OCPs or Lupron to see if her symptoms improve. Pt was interested in having a GI work-up to rule out GI etiology given that she was experiencing bloating and constipation. A GI referral was placed.     Today Fela is here; she has completed all but one of the tests in her GI evaluation and so far there has been no explanation for her symptoms. She still considering completing the hydrogen breath test and is working on finding a time that works with her schedule. She continues to experience severe pelvic pain (chronic) that comes and goes throughout the day. Pain is mostly localized in  the center, but will have occasional sharp pain in the sides of her pelvic. Characterizes her pain as cramping and sometimes feels like a twisting aching pain. She has occasional bleeding during intercourse and after, and pain with deep penetration. She is having rectal pain with her periods. She continues to have bloating. 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  She denies dysuria, urinary frequency and urgency. Denies any vaginal discharge, itching, or odor. She has previously declined STD testing.    Pertinent labs:  - CMP normal 6/2023  - Pap smear: 3/2021 NIL  - CBC WNL 5/2023 except for WBC of 3.9    Pelvic US 4/18/2023:  Impression: Pelvic ultrasound is within normal limits, including  appropriate position of the IUD.    Past Medical History:   Diagnosis Date    Acne     on minocycline 10/2012-1/2014    Allergic rhinitis     Depressive disorder     Headache(784.0)     tension-type by description    Scoliosis     TLSO bracing for 3 years     History reviewed. No pertinent surgical history.  Family History   Problem Relation Age of Onset    Depression Mother     Multiple Sclerosis Father     Thyroid Disease Sister     Depression Sister     Depression Sister     SLE Maternal Grandfather     Crohn's Disease Paternal Grandmother     Arthritis Paternal Grandmother      Current Outpatient Medications   Medication    buPROPion (WELLBUTRIN XL) 150 MG 24 hr tablet    DULoxetine (CYMBALTA) 30 MG capsule    famotidine (PEPCID) 20 MG tablet    levonorgestrel (MIRENA, 52 MG,) 20 MCG/DAY IUD    minoxidil (LONITEN) 10 MG tablet    rizatriptan (MAXALT-MLT) 10 MG ODT     No current facility-administered medications for this visit.     Allergies   Allergen Reactions    Minocycline Other (See Comments) and Muscle Pain (Myalgia)     Other reaction(s): Arthralgia  HUT Reaction: Arthralgia    Other reaction(s): Arthralgia    "      Objective:  /80   Pulse 78   Ht 1.58 m (5' 2.21\")   Wt 86.2 kg (190 lb)   BMI 34.52 kg/m     EXAM:  Constitutional: healthy, alert and no distress   Respiratory: normal non-labored breathing.   Psychiatric: mentation appears normal and affect normal/bright  Musculoskeletal: extremities normal- no gross deformities noted, gait normal and normal muscle tone    Assessment/Plan:  1. Pelvic pain in female  Acknowledged how difficult and long this process has been on finding out the cause of her chronic pelvic pain. It is reassuring that all of her GI work up came back negative. She had been recommended to see a nutritionist (per review of previous notes), which she may consider.  Recommended for Fela to complete her hydrogen breath test for a thorough GI work up. Discussed possible gyn causes for her symptoms, with most likely diagnosis being endometriosis. The only definitive way to diagnose endometriosis is through laparoscopy, which is a procedure that carries some risks. The other approach we discussed is to trial OCPs (in addition to her LNG-IUD) and evaluate if her symptoms do improve. She has a hx of migraines withOUT aura so would be a candidate for estrogen. Alternatively, she could trial a GnRH agonist (Lupron); reviewed common SE.   If symptoms improve, it would suggest endometriosis as a likely etiology. She is currently in the process of switching her antidepressant medication and is not interested in starting another new medication at this time. She is considering scheduling for a laparoscopy procedure, but desires for this to be done at a location other than the [facility] [place], as she would like care outside of her work area. She will contact her insurance to understand where she has insurance coverage and contact writer via [employer] once she decides on her preferred approach.     Patient left, stable. She expressed understanding and agreement with the plan for care.    Jessica" Aravind, DNP, APRN, WHNP

## 2023-08-21 ENCOUNTER — MYC MEDICAL ADVICE (OUTPATIENT)
Dept: OBGYN | Facility: CLINIC | Age: 28
End: 2023-08-21
Payer: COMMERCIAL

## 2023-09-25 NOTE — PROGRESS NOTES
[facility]  Gynecology Visit    HPI:    Fela Solorzano is a 28 year old , here for consultation for possible diagnostic laparoscopy given suspicion for endometriosis. Patient was initially seen starting in 2023 for long standing chronic pelvic pain. She reported associated post-coital bleeding, dyspareunia, bloating and constipation. While Mirena IUD provided some relief, pain has been worsening over past couple of months. She had a Pelvic US performed at this time which showed normal pelvic anatomy and a well positioned IUD. She additionally had a workup with GI which did not show a cause of her pain. Both providers at [facility] and [facility] providers thought her symptoms were c/w endometriosis and offered her a trial of OCPs vs Lupron but she was not interested in starting these medications as she previously trialed many OCPs.      Today, reports that she has ongoing symptoms of pelvic pain, bloating, constipation, dyspareunia, and post-coital bleeding. She confirms that at this time she is interested in surgical intervention as she would like a definitive diagnosis. She is interested in trying OCPs again after procedure but would like to proceed with diagnostic evaluation.     OBHx  OB History    Para Term  AB Living   0 0 0 0 0 0   SAB IAB Ectopic Multiple Live Births   0 0 0 0 0     PMH  Past Medical History:   Diagnosis Date    Acne     on minocycline 10/2012-2014    Allergic rhinitis     Depressive disorder     Headache(784.0)     tension-type by description    Scoliosis     TLSO bracing for 3 years     PSH  No past surgical history on file.    Medications    Current Outpatient Medications:     buPROPion (WELLBUTRIN XL) 150 MG 24 hr tablet, Take 150 mg by mouth every morning, Disp: , Rfl:     DULoxetine (CYMBALTA) 30 MG capsule, Take 30 mg by mouth 2 times daily, Disp: , Rfl:     famotidine (PEPCID) 20 MG tablet, Take 1 tablet (20 mg) by mouth 2 times daily, Disp: 180 tablet,  Rfl: 3    levonorgestrel (MIRENA, 52 MG,) 20 MCG/DAY IUD, 1 each by Intrauterine route, Disp: , Rfl:     minoxidil (LONITEN) 10 MG tablet, Take 1/2 tablet once daily, Disp: 45 tablet, Rfl: 3    rizatriptan (MAXALT-MLT) 10 MG ODT, Take 1 tablet (10 mg) by mouth at onset of headache for migraine May repeat in 2 hours. Max 3 tablets/24 hours., Disp: 30 tablet, Rfl: 0    ROS: 10-Point ROS negative except as noted in HPI    Physical Exam  There were no vitals taken for this visit.  Gen: Well-appearing, NAD  HEENT: Normocephalic, atraumatic  CV:  Regular rate, well perfused  Pulm: Breathing comfortably on room air   Abd: Soft, non-tender, non-distended    Assessment/Plan:  Felahyacinth Solorzano is a 28 year old  female here for surgical consultation for endometriosis.     Suspected endometriosis  Reviewed the pathogenesis of Endometriosis being a chronic, estrogen dependent disorder causing pelvic pain, dysmenorrhea, dyspareunia and infertility. In most cases, a presumptive diagnosis of endometriosis is made based upon a classic pattern of symptoms and the exclusion of other etiologies. A clinical diagnosis is sufficient to initiate therapy. Discussed treatment options. In women with mild to moderate pain first line treatment is NSAIDs and continuous hormonal contraceptives because these therapies are low-risk, have few side effects, and provide relief of symptoms for many women. Estrogen-progestin contraceptives are thought to suppress ovarian function and thereby reduce endometriosis disease activity and pain. Progestin-only contraceptive pills/impant/IUD/DMPA with an NSAID are also another option. Progestins inhibit endometrial tissue growth by causing initial decidualization and then atrophy. Women with adequate symptom improvement are continued on the hormonal therapy/NSAID regimen until pregnancy is desired or the average age of menopause is reached. In women with severe symptoms that do not respond to the above  therapies, or recurrent symptoms are offered a trial of GnRH agonist with add-back hormonal therapy or laparoscopy for diagnosis and treatment (if they have not already done so). Excision of endometrial implants, endometriomas, and adhesions can be performed at the time of surgery. After surgery, hormonal suppression (usually with continuous oral contraceptives) is started to prevent recurrence of symptoms.   - After discussion of management options as above, patient would like to proceed with diagnostic laparoscopy, possible excision of endometriosis. Discussed option for chromopertubation at time of laparoscopy if patient has intention for future childbearing. She declines at this time. Discussed surgical risks including bleeding, infection, and damage to surrounding tissues/structures. Discussed that risk of inadvertent injury can be higher in patients with endometriosis if severe inflammation/adhesive disease is present. She expresses understanding of these risks and would like to proceed. Case request placed. Patient requesting pap smear at the time of procedure.   - Discussed that rate of re-operation for endometriosis due to pain is ~36%. Therefore, could consider OCP, systemic progestin vs GnRH agonist use after procedure with IUD for optimal symptom management. Patient open to this.     Staffed with Dr. Lissa Aldridge MD  Ob/Gyn PGY-2  09/26/23 4:53 PM    The Patient was seen in [facility] by SHERRY ALDRIDGE.  I reviewed the history & exam. Assessment and plan were jointly made.    Yolis Alcaraz MD

## 2023-09-26 ENCOUNTER — OFFICE VISIT (OUTPATIENT)
Dept: OBGYN | Facility: CLINIC | Age: 28
End: 2023-09-26
Payer: COMMERCIAL

## 2023-09-26 VITALS
HEIGHT: 62 IN | BODY MASS INDEX: 35.81 KG/M2 | HEART RATE: 80 BPM | SYSTOLIC BLOOD PRESSURE: 113 MMHG | WEIGHT: 194.6 LBS | DIASTOLIC BLOOD PRESSURE: 76 MMHG

## 2023-09-26 DIAGNOSIS — R10.2 PELVIC PAIN: Primary | ICD-10-CM

## 2023-09-26 PROCEDURE — 99213 OFFICE O/P EST LOW 20 MIN: CPT | Mod: GE

## 2023-09-26 PROCEDURE — 250N000011 HC RX IP 250 OP 636

## 2023-09-26 PROCEDURE — G0008 ADMIN INFLUENZA VIRUS VAC: HCPCS

## 2023-09-26 PROCEDURE — 90686 IIV4 VACC NO PRSV 0.5 ML IM: CPT

## 2023-09-26 PROCEDURE — G0463 HOSPITAL OUTPT CLINIC VISIT: HCPCS | Mod: 25

## 2023-09-26 NOTE — NURSING NOTE
Chief Complaint   Patient presents with    Consult     Surgery consult for laparoscopy for possible endometriosis

## 2023-09-26 NOTE — PATIENT INSTRUCTIONS
Thank you for trusting us with your care!     If you need to contact us for questions about:  Symptoms, Scheduling & Medical Questions; Non-urgent (2-3 day response) Damien message, Urgent (needing response today) [phone] (if after 3:30pm next day response)   Prescriptions: Please call your Pharmacy   Billing: [facility] [phone] or [facility]:[phone]

## 2023-09-27 DIAGNOSIS — G43.709 CHRONIC MIGRAINE WITHOUT AURA WITHOUT STATUS MIGRAINOSUS, NOT INTRACTABLE: ICD-10-CM

## 2023-09-28 RX ORDER — RIZATRIPTAN BENZOATE 10 MG/1
TABLET, ORALLY DISINTEGRATING ORAL
Qty: 30 TABLET | Refills: 3 | Status: SHIPPED | OUTPATIENT
Start: 2023-09-28

## 2023-09-29 ENCOUNTER — HOSPITAL ENCOUNTER (OUTPATIENT)
Facility: AMBULATORY SURGERY CENTER | Age: 28
Discharge: HOME | End: 2023-09-29
Attending: OBSTETRICS & GYNECOLOGY
Payer: COMMERCIAL

## 2023-11-01 ENCOUNTER — VIRTUAL VISIT (OUTPATIENT)
Dept: PSYCHOLOGY | Facility: CLINIC | Age: 28
End: 2023-11-01
Attending: NURSE PRACTITIONER
Payer: COMMERCIAL

## 2023-11-01 DIAGNOSIS — F33.1 MAJOR DEPRESSIVE DISORDER, RECURRENT EPISODE, MODERATE (H): ICD-10-CM

## 2023-11-01 DIAGNOSIS — F41.1 GENERALIZED ANXIETY DISORDER: Primary | ICD-10-CM

## 2023-11-01 DIAGNOSIS — F88 DEVELOPMENTAL MENTAL DISORDER: ICD-10-CM

## 2023-11-01 PROCEDURE — 90834 PSYTX W PT 45 MINUTES: CPT | Mod: VID | Performed by: PSYCHOLOGIST

## 2023-11-01 RX ORDER — BUSPIRONE HYDROCHLORIDE 15 MG/1
TABLET ORAL
COMMUNITY
Start: 2023-09-27 | End: 2024-03-27

## 2023-11-01 ASSESSMENT — COLUMBIA-SUICIDE SEVERITY RATING SCALE - C-SSRS
ATTEMPT LIFETIME: NO
TOTAL  NUMBER OF INTERRUPTED ATTEMPTS LIFETIME: NO
2. HAVE YOU ACTUALLY HAD ANY THOUGHTS OF KILLING YOURSELF?: NO
6. HAVE YOU EVER DONE ANYTHING, STARTED TO DO ANYTHING, OR PREPARED TO DO ANYTHING TO END YOUR LIFE?: NO
1. HAVE YOU WISHED YOU WERE DEAD OR WISHED YOU COULD GO TO SLEEP AND NOT WAKE UP?: YES
1. IN THE PAST MONTH, HAVE YOU WISHED YOU WERE DEAD OR WISHED YOU COULD GO TO SLEEP AND NOT WAKE UP?: NO
TOTAL  NUMBER OF ABORTED OR SELF INTERRUPTED ATTEMPTS LIFETIME: NO

## 2023-11-01 ASSESSMENT — ANXIETY QUESTIONNAIRES
5. BEING SO RESTLESS THAT IT IS HARD TO SIT STILL: MORE THAN HALF THE DAYS
2. NOT BEING ABLE TO STOP OR CONTROL WORRYING: SEVERAL DAYS
7. FEELING AFRAID AS IF SOMETHING AWFUL MIGHT HAPPEN: SEVERAL DAYS
GAD7 TOTAL SCORE: 11
GAD7 TOTAL SCORE: 11
6. BECOMING EASILY ANNOYED OR IRRITABLE: MORE THAN HALF THE DAYS
1. FEELING NERVOUS, ANXIOUS, OR ON EDGE: SEVERAL DAYS
3. WORRYING TOO MUCH ABOUT DIFFERENT THINGS: MORE THAN HALF THE DAYS

## 2023-11-01 ASSESSMENT — PATIENT HEALTH QUESTIONNAIRE - PHQ9
SUM OF ALL RESPONSES TO PHQ QUESTIONS 1-9: 15
10. IF YOU CHECKED OFF ANY PROBLEMS, HOW DIFFICULT HAVE THESE PROBLEMS MADE IT FOR YOU TO DO YOUR WORK, TAKE CARE OF THINGS AT HOME, OR GET ALONG WITH OTHER PEOPLE: VERY DIFFICULT
SUM OF ALL RESPONSES TO PHQ QUESTIONS 1-9: 15
5. POOR APPETITE OR OVEREATING: MORE THAN HALF THE DAYS
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
## 2023-11-02 ENCOUNTER — OFFICE VISIT (OUTPATIENT)
Dept: FAMILY MEDICINE | Facility: CLINIC | Age: 28
End: 2023-11-02
Payer: COMMERCIAL

## 2023-11-02 VITALS
BODY MASS INDEX: 36.44 KG/M2 | HEART RATE: 98 BPM | SYSTOLIC BLOOD PRESSURE: 110 MMHG | HEIGHT: 62 IN | TEMPERATURE: 98.7 F | OXYGEN SATURATION: 99 % | RESPIRATION RATE: 16 BRPM | DIASTOLIC BLOOD PRESSURE: 70 MMHG | WEIGHT: 198 LBS

## 2023-11-02 DIAGNOSIS — Z23 NEED FOR VACCINATION: ICD-10-CM

## 2023-11-02 DIAGNOSIS — Z01.818 PREOPERATIVE EXAMINATION: Primary | ICD-10-CM

## 2023-11-02 DIAGNOSIS — R10.2 PELVIC PAIN: ICD-10-CM

## 2023-11-02 PROCEDURE — 90480 ADMN SARSCOV2 VAC 1/ONLY CMP: CPT | Performed by: NURSE PRACTITIONER

## 2023-11-02 PROCEDURE — 99213 OFFICE O/P EST LOW 20 MIN: CPT | Performed by: NURSE PRACTITIONER

## 2023-11-02 PROCEDURE — 91320 SARSCV2 VAC 30MCG TRS-SUC IM: CPT | Performed by: NURSE PRACTITIONER

## 2023-11-02 ASSESSMENT — PAIN SCALES - GENERAL: PAINLEVEL: MODERATE PAIN (4)

## 2023-11-02 NOTE — PROGRESS NOTES
[facility]  [address]  [facility] [address]  Phone: [phone]  Primary Provider: Shannan Saha  Pre-op Performing Provider: SHANNAN SAHA      PREOPERATIVE EVALUATION:  Today's date: 11/2/2023    Fela is a 28 year old female who presents for a preoperative evaluation.      Surgical Information:  Surgery/Procedure: Diagnostic laparascopy - endometriosis  Surgery Location: [facility]  Surgeon: Dr. ANDRES Enriquez  Surgery Date: 11/21/2023  Time of Surgery: tbd  Where patient plans to recover: At home with family  Fax number for surgical facility: Note does not need to be faxed, will be available electronically in Epic.    Assessment & Plan     The proposed surgical procedure is considered LOW risk.    Preoperative examination  Scheduled for a laparoscopic procedure to evaluate uterine wall and rule out endometriosis  No cardiac history; no chest pain, heart palpitations, shortness of breath. Can participate in strenuous activities/sports such as swimming, singles tennis, football, basketball, and skiing (>10 METs)      Pelvic pain  Chronic pelvic pain that is intermittent, worse with cycles and intercourse.    Need for vaccination  - COVID-19 12+ (2023-24) (PFIZER)         - No identified additional risk factors other than previously addressed    Antiplatelet or Anticoagulation Medication Instructions:   - Patient is on no antiplatelet or anticoagulation medications.    Additional Medication Instructions:  Patient is to take all scheduled medications on the day of surgery    RECOMMENDATION:  APPROVAL GIVEN to proceed with proposed procedure, without further diagnostic evaluation.      Subjective       HPI related to upcoming procedure: history of pelvic pain, worse for cycles and intercourse needs to have further evaluation to evaluate for endometriosis.        10/26/2023     2:54 PM   Preop Questions   1. Have you ever had a heart attack or stroke? No   2.  Have you ever had surgery on your heart or blood vessels, such as a stent placement, a coronary artery bypass, or surgery on an artery in your head, neck, heart, or legs? No   3. Do you have chest pain with activity? No   4. Do you have a history of  heart failure? No   5. Do you currently have a cold, bronchitis or symptoms of other infection? No   6. Do you have a cough, shortness of breath, or wheezing? No   7. Do you or anyone in your family have previous history of blood clots? YES - mother had a PE after a foot surgery   8. Do you or does anyone in your family have a serious bleeding problem such as prolonged bleeding following surgeries or cuts? No   9. Have you ever had problems with anemia or been told to take iron pills? No   10. Have you had any abnormal blood loss such as black, tarry or bloody stools, or abnormal vaginal bleeding? No   11. Have you ever had a blood transfusion? No   12. Are you willing to have a blood transfusion if it is medically needed before, during, or after your surgery? Yes   13. Have you or any of your relatives ever had problems with anesthesia? No   14. Do you have sleep apnea, excessive snoring or daytime drowsiness? No   15. Do you have any artifical heart valves or other implanted medical devices like a pacemaker, defibrillator, or continuous glucose monitor? No   16. Do you have artificial joints? No   17. Are you allergic to latex? No   18. Is there any chance that you may be pregnant? No       Health Care Directive:  Patient does not have a Health Care Directive or Living Will: Discussed advance care planning with patient; however, patient declined at this time.    Preoperative Review of :   reviewed - no record of controlled substances prescribed.      Status of Chronic Conditions:  See problem list for active medical problems.  Problems all longstanding and stable, except as noted/documented.  See ROS for pertinent symptoms related to these conditions.    Review  of Systems  CONSTITUTIONAL: NEGATIVE for fever, chills, change in weight  INTEGUMENTARY/SKIN: NEGATIVE for worrisome rashes, moles or lesions  EYES: NEGATIVE for vision changes or irritation  ENT/MOUTH: NEGATIVE for ear, mouth and throat problems  RESP: NEGATIVE for significant cough or SOB  CV: NEGATIVE for chest pain, palpitations or peripheral edema  GI: NEGATIVE for nausea, abdominal pain, heartburn, or change in bowel habits  : NEGATIVE for frequency, dysuria, or hematuria  MUSCULOSKELETAL: NEGATIVE for significant arthralgias or myalgia  NEURO: NEGATIVE for weakness, dizziness or paresthesias  ENDOCRINE: NEGATIVE for temperature intolerance, skin/hair changes  HEME: NEGATIVE for bleeding problems  PSYCHIATRIC: NEGATIVE for changes in mood or affect    Patient Active Problem List    Diagnosis Date Noted    Pelvic pain 09/26/2023     Priority: Medium    Chronic migraine without aura without status migrainosus, not intractable 06/23/2023     Priority: Medium    Health care maintenance 03/24/2023     Priority: Medium    Inattention 03/24/2023     Priority: Medium    Vegetarian diet 03/24/2023     Priority: Medium    Gastroesophageal reflux disease with esophagitis without hemorrhage 03/24/2023     Priority: Medium    Alopecia 03/24/2023     Priority: Medium    Minocycline adverse reaction 10/08/2014     Priority: Medium    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  On hydroxychloroquine, NSAID (naproxen--> meloxicam due to GI upset) started 1/20/2014, methotrexate started 4/3/2014 with 2 week bridge of prednisone (20mg to off). In clinical remission on medication 6/19/2014 and 10/8/2014.  Off all medications soon after 10/8/2014.      Other and unspecified nonspecific immunological findings 01/30/2014     Priority: Medium     BETTY >1:1280, dsDNA 100's (-->43 5/2014), anti-histone antibody (8), otherwise normal follow up  "labs.  Hydroxychloroquine started 1/30/2014.  After 15 months of minocycline        Past Medical History:   Diagnosis Date    Acne     on minocycline 10/2012-1/2014    Allergic rhinitis     Depressive disorder     Headache(784.0)     tension-type by description    Scoliosis     TLSO bracing for 3 years     No past surgical history on file.  Current Outpatient Medications   Medication Sig Dispense Refill    buPROPion (WELLBUTRIN XL) 150 MG 24 hr tablet Take 150 mg by mouth every morning      busPIRone (BUSPAR) 15 MG tablet       DULoxetine (CYMBALTA) 30 MG capsule Take 20 mg by mouth      famotidine (PEPCID) 20 MG tablet Take 1 tablet (20 mg) by mouth 2 times daily 180 tablet 3    levonorgestrel (MIRENA, 52 MG,) 20 MCG/DAY IUD 1 each by Intrauterine route      minoxidil (LONITEN) 10 MG tablet Take 1/2 tablet once daily 45 tablet 3    rizatriptan (MAXALT-MLT) 10 MG ODT DISSOLVE 1 TABLET (10 MG) BY MOUTH AT ONSET OF HEADACHE FOR MIGRAINE -- MAY REPEAT IN 2 HOURS. MAX 3 TABLETS/24 HOURS. 30 tablet 3       Allergies   Allergen Reactions    Minocycline Other (See Comments) and Muscle Pain (Myalgia)     Other reaction(s): Arthralgia  HUT Reaction: Arthralgia    Other reaction(s): Arthralgia          Social History     Tobacco Use    Smoking status: Never    Smokeless tobacco: Never   Substance Use Topics    Alcohol use: Yes     Comment: 1-2 times a month     Family History   Problem Relation Age of Onset    Depression Mother     Multiple Sclerosis Father     Thyroid Disease Sister     Depression Sister     Depression Sister     SLE Maternal Grandfather     Crohn's Disease Paternal Grandmother     Arthritis Paternal Grandmother      History   Drug Use Unknown         Objective     /70 (BP Location: Right arm, Patient Position: Sitting, Cuff Size: Adult Large)   Pulse 98   Temp 98.7  F (37.1  C) (Tympanic)   Resp 16   Ht 1.58 m (5' 2.21\")   Wt 89.8 kg (198 lb)   LMP 01/01/2021 (Approximate)   SpO2 99%   BMI " 35.97 kg/m      Physical Exam    GENERAL APPEARANCE: healthy, alert and no distress     EYES: EOMI, PERRL     HENT: ear canals and TM's normal and nose and mouth without ulcers or lesions     NECK: no adenopathy, no asymmetry, masses, or scars and thyroid normal to palpation     RESP: lungs clear to auscultation - no rales, rhonchi or wheezes     CV: regular rates and rhythm, normal S1 S2, no S3 or S4 and no murmur, click or rub     ABDOMEN:  soft, nontender, no HSM or masses and bowel sounds normal     MS: extremities normal- no gross deformities noted, no evidence of inflammation in joints, FROM in all extremities.     SKIN: no suspicious lesions or rashes     NEURO: Normal strength and tone, sensory exam grossly normal, mentation intact and speech normal     PSYCH: mentation appears normal. and affect normal/bright     LYMPHATICS: No cervical adenopathy    Recent Labs   Lab Test 06/14/23  1519   HGB 12.3         POTASSIUM 3.6   CR 0.82        Diagnostics:  No labs were ordered during this visit.   No EKG required for low risk surgery (cataract, skin procedure, breast biopsy, etc).    Revised Cardiac Risk Index (RCRI):  The patient has the following serious cardiovascular risks for perioperative complications:   - No serious cardiac risks = 0 points     RCRI Interpretation: 0 points: Class I (very low risk - 0.4% complication rate)         Signed Electronically by: RASHEEDA Love CNP  Copy of this evaluation report is provided to requesting physician.

## 2023-11-08 ENCOUNTER — VIRTUAL VISIT (OUTPATIENT)
Dept: PSYCHOLOGY | Facility: CLINIC | Age: 28
End: 2023-11-08
Payer: COMMERCIAL

## 2023-11-08 DIAGNOSIS — F33.1 MAJOR DEPRESSIVE DISORDER, RECURRENT EPISODE, MODERATE (H): ICD-10-CM

## 2023-11-08 DIAGNOSIS — F88 DEVELOPMENTAL MENTAL DISORDER: ICD-10-CM

## 2023-11-08 DIAGNOSIS — F41.1 GENERALIZED ANXIETY DISORDER: Primary | ICD-10-CM

## 2023-11-08 PROCEDURE — 90791 PSYCH DIAGNOSTIC EVALUATION: CPT | Mod: 95 | Performed by: PSYCHOLOGIST

## 2023-11-08 ASSESSMENT — PATIENT HEALTH QUESTIONNAIRE - PHQ9
SUM OF ALL RESPONSES TO PHQ QUESTIONS 1-9: 17
10. IF YOU CHECKED OFF ANY PROBLEMS, HOW DIFFICULT HAVE THESE PROBLEMS MADE IT FOR YOU TO DO YOUR WORK, TAKE CARE OF THINGS AT HOME, OR GET ALONG WITH OTHER PEOPLE: VERY DIFFICULT
SUM OF ALL RESPONSES TO PHQ QUESTIONS 1-9: 17
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
## 2023-11-20 ENCOUNTER — TELEPHONE (OUTPATIENT)
Dept: OBGYN | Facility: CLINIC | Age: 28
End: 2023-11-20
Payer: COMMERCIAL

## 2023-11-20 NOTE — TELEPHONE ENCOUNTER
Phone call to patient to inform them we are having to reschedule their surgery due to provider illness. Patient stated they will reach back out when they can get time off from work.  Surgery cancelled and will wait to hear back from patient.     Annmarie BEGUM 11/20/23

## 2023-11-30 ENCOUNTER — VIRTUAL VISIT (OUTPATIENT)
Dept: PSYCHOLOGY | Facility: CLINIC | Age: 28
End: 2023-11-30
Payer: COMMERCIAL

## 2023-11-30 DIAGNOSIS — F33.1 MAJOR DEPRESSIVE DISORDER, RECURRENT EPISODE, MODERATE (H): ICD-10-CM

## 2023-11-30 DIAGNOSIS — F41.1 GENERALIZED ANXIETY DISORDER: Primary | ICD-10-CM

## 2023-11-30 PROCEDURE — 96131 PSYCL TST EVAL PHYS/QHP EA: CPT | Mod: 95 | Performed by: PSYCHOLOGIST

## 2023-11-30 PROCEDURE — 96130 PSYCL TST EVAL PHYS/QHP 1ST: CPT | Mod: 95 | Performed by: PSYCHOLOGIST

## 2023-11-30 NOTE — PROGRESS NOTES
[facility]   Mental Health & Addiction Services     Progress Note - ADHD Feedback Session     Patient Name: Sandro Solorzano  Date: 2023       Service Type:  Individual       Session Start Time: 5:00pm  Session End Time:  5:30pm     Session Length: 30 minutes    Session #: 3    Attendees: Patient attended alone    Service Modality: Video Visit:      Provider verified identity through the following two step process.  Patient provided:  Patient  and Patient address    Telemedicine Visit: The patient's condition can be safely assessed and treated via synchronous audio and visual telemedicine encounter.      Reason for Telemedicine Visit: Services only offered telehealth    Originating Site (Patient Location): Patient's home    Distant Site (Provider Location): [facility] MENTAL [facility] & ADDICTION [facility]    Consent:  The patient/guardian has verbally consented to: the potential risks and benefits of telemedicine (video visit) versus in person care; bill my insurance or make self-payment for services provided; and responsibility for payment of non-covered services.     Patient would like the video invitation sent by:  Send to e-mail at: [email or web address]    Mode of Communication:  Video Conference via [facility]    Distant Location (Provider):  On-site    As the provider I attest to compliance with applicable laws and regulations related to telemedicine.          2023    10:20 AM 2023     4:45 PM   PHQ   PHQ-9 Total Score 15 17   Q9: Thoughts of better off dead/self-harm past 2 weeks Not at all Not at all           3/20/2023     5:08 PM 2023    10:20 AM   HAYLEY-7 SCORE   Total Score 6 (mild anxiety)    Total Score 6 11         DATA      Progress Since Last Session (Related to Symptoms / Goals / Homework):   Symptoms: Stable.    Homework: Completed.      Treatment Objective(s) Addressed in This  Session:   Provided feedback on ADHD evaluation. Reviewed test results in depth. Plan of care and recommendations were discussed based on testing data. See full report attached on secondary note in this encounter.     Intervention:   Provided feedback to patient regarding testing results, diagnoses, and treatment recommendations. Test results are not consistent with an ADHD diagnosis. Symptoms are better explained by depression and anxiety disorders. Personalized suggestions regarding symptoms were offered. Patient had the opportunity to ask questions; she expressed understanding.        ASSESSMENT: Current Emotional / Mental Status (status of significant symptoms):   Risk status (Self / Other harm or suicidal ideation)   Patient denies current fears or concerns for personal safety.   Patient denies current or recent suicidal ideation or behaviors.   Patient denies current or recent homicidal ideation or behaviors.   Patient denies current or recent self injurious behavior or ideation.   Patient denies other safety concerns.   Patient reports there has been no change in risk factors since their last session.     Patient reports there has been no change in protective factors since their last session.     Recommended that patient call 911 or go to the local ED should there be a change in any of these risk factors.     Appearance:   Appropriate    Eye Contact:   Good    Psychomotor Behavior: Normal    Attitude:   Cooperative    Orientation:   All   Speech    Rate / Production: Normal     Volume:  Normal    Mood:    Normal   Affect:    Appropriate    Thought Content:  Clear    Thought Form:  Coherent  Logical    Insight:    Good      Medication Review:   No changes to current psychiatric medication(s)     Medication Compliance:   Yes     Changes in Health Issues:   None reported     Chemical Use Review:   Substance Use: Chemical use reviewed, no active concerns identified      Nicotine Use: No current tobacco use.       Diagnosis:  1. Generalized anxiety disorder    2. Major depressive disorder, recurrent episode, moderate (H)        PLAN:   Recommendations are outlined in full evaluation report (attached to this encounter).   Patient indicated understanding and will contact the clinic if there are further questions.    Parts of this documentation may have been completed using dictation software. Potential errors may result and are unintentional.       More Cespedes PsyD, LP  Clinical Psychologist         Psychological Testing Services Summary       Testing Evaluation Services Base: 29425  (first 60 mins) Add-on: 33428  (each addtl 60 mins)   Record Review and Clarify Referral Question   10:50am-11:00am on 11/1/2023 10 minutes   Clinical Decision Making/Battery Modification   4:$5pm-5:00pm on 11/8/2023 15 minutes   Integration/Report Generation   6:00pm-7:00pm on 11/29/2023 ([facility])  7:00pm-7:30pm on 11/29/2023 ([facility] Vital Signs)  7:30pm-8:15pm on 11/29/2023 (MMPI-3)  8:15pm-9:30pm on 11/29/2023 (Final Report)   60 minutes  30 minutes  30 minutes  75 minutes   Interactive Feedback Session   5:00pm-5:30 on 11/30/2023 30 minutes   Post-Service Work   5:30pm-5:40pm on 11/30/2023 10 minutes   Total Time: 260 minutes   Total Units: 1 3       Diagnoses:   F41.1 Generalized Anxiety Disorder  F33.1 Major Depressive Disorder, recurrent episode, moderate

## 2023-11-30 NOTE — PROGRESS NOTES
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 at home and work.   D. Impairment - Do symptoms interfere with or reduce the quality of functioning? Yes, patient is unable to complete daily tasks effectively.   E. Exclusions - Are symptoms better explained by another disorder or factor? Yes, symptoms are better explained by anxiety and depression symptoms. Difficulties are not explained by an organic basis of inattention.     The patient meets the following DSM-5 criteria for generalized anxiety disorder:  A. Excessive anxiety and worry, occurring more days than not for at least 6 months about a number of events or activities.   B. The individual finds it difficult to control the worry.  C. The anxiety and worry are associated with 3 or more of 6 symptoms.  D. The anxiety, worry, or physical symptoms cause clinically significant distress or impairment in social, occupational, or other important areas of functioning.  E. The disturbance is not attributable to the physiological effects of a substance (e.g., a drug of abuse, a medication) or another medical condition (e.g., hyperthyroidism).  F. The disturbance is not better explained by another mental disorder.    The patient also meets the following DSM-5 criteria for major depressive disorder:  A. Five (or more) symptoms have been present during the same  2-week period and represent a change from previous functioning; at least one of the symptoms is either (1) depressed mood or (2) loss of interest or pleasure.   Depressed mood.   Diminished interest or pleasure in all, or almost all, activities.   Significant appetite change.  Significant sleep change.   Fatigue or loss of energy.   Feelings of worthlessness or inappropriate guilt.   Diminished ability to think or concentrate, or indecisiveness.   Psychomotor agitation or lethargy.  B. The symptoms cause clinically significant distress or impairment in social, occupational, or other important areas of functioning.  C. The episode is not attributable to the physiological effects of a substance or to another medical condition.  D. The occurrence of major depressive episode is not better explained by other thought / psychotic disorders.  E. There has never been a manic episode or hypomanic episode.     DIAGNOSES:  F41.1 Generalized Anxiety Disorder  F33.1 Major Depressive Disorder, recurrent episode, moderate    PLAN OF CARE:  Discuss the following with your primary care provider:  Discussed the effectiveness of currently prescribed psychotropic medication.  Depression and anxiety symptoms remain significant.    Continue individual psychotherapy.    Complete sleep study.    RECOMMENDATIONS:  Due to the patient's reported attention, concentration, and mood difficulties, the following health/lifestyle changes when combined, can significantly improve symptoms:   Avoid simple carbohydrates at breakfast. Aim for only complex carbohydrates and lean protein for your morning meal.   Engage in aerobic exercise 3 times per week for 30 minutes, ensuring that your heart rate stays within your training zone. Further, reading the book,  Luis Angel,  by Nikolay Messer M.D can help the patient understand the benefits of exercise on the brain.   Research suggest that taking a high-quality multi-vitamin and antioxidant (1/2 cup of blueberries)  daily in conjunction with balanced nutrition can be helpful.  Aim for the high end of daily water intake: around 72 ounces per day.  Ensure regular meals and snacks to maintain optimal attention.    The following may be beneficial in managing some of the patient's attention and concentration difficulties:  Due to the patient's difficulties with attention and concentration, consider working in a completely distraction-free area while completing tasks. Workspaces should be completely clear except for the materials needed for the current task. Both visual and auditory distractions should be decreased as much as possible.  Considering decreased ability to focus and maintain attention, it is recommended that the patient take frequent breaks while completing tasks. This will help to maintain attention and effort. The patient may benefit from the use of a [employer] Timer. The timer works by using built-in break times. After working on a task consistently for 25 minutes, the timer reminds the user to take a five-minute break before continuing, etc. A [employer] timer can be downloaded as a free mumtaz to a phone or tablet.  Due to the patient's attentional and concentration symptoms, it is recommended to increase organization with the use of lists and calendars. Significantly increasing structure to the day and adhering to a set schedule can increase your ability to complete responsibilities, track deadline, etc. Breaking these tasks down into their component parts and recording them in a calendar/planner will likely be beneficial. Patient would benefit from setting feasible timelines for completion of activities. By establishing clear priorities for completing tasks, you can more likely complete the most important tasks first. The patient may also choose to elect to a friend or family member to help hold them accountable.    Avoid multitasking. Attempting to work on multiple tasks and projects the same increases the likelihood  "that an error will occur. Focus on one task at a time.    The patient may benefit from engaging in mindfulness practices. This may include breathing techniques, apps that provide guided meditation, or more interactive activities such as coloring.    Develop a \"coping skills jar/box.\" This entails designating a certain container to hold slips of paper with distraction technique ideas written on each slip of paper. Distraction techniques may include listening to a certain type of music, playing on game on your phone, doing a breathing exercise, spending time with a pet, calling a certain individual, looking at a magazine, working on a puzzle, etc. When feeling distressed, choose a slip of paper from the container and engage in that activity rather than focusing on the problem.      More Cespedes PsyD, LP  Clinical Psychologist  "

## 2024-01-17 ENCOUNTER — OFFICE VISIT (OUTPATIENT)
Dept: FAMILY MEDICINE | Facility: CLINIC | Age: 29
End: 2024-01-17
Payer: COMMERCIAL

## 2024-01-17 VITALS
SYSTOLIC BLOOD PRESSURE: 124 MMHG | HEART RATE: 82 BPM | RESPIRATION RATE: 16 BRPM | BODY MASS INDEX: 35.33 KG/M2 | TEMPERATURE: 99.1 F | OXYGEN SATURATION: 98 % | HEIGHT: 62 IN | DIASTOLIC BLOOD PRESSURE: 78 MMHG | WEIGHT: 192 LBS

## 2024-01-17 DIAGNOSIS — G43.709 CHRONIC MIGRAINE WITHOUT AURA WITHOUT STATUS MIGRAINOSUS, NOT INTRACTABLE: ICD-10-CM

## 2024-01-17 DIAGNOSIS — R10.2 PELVIC PAIN: ICD-10-CM

## 2024-01-17 DIAGNOSIS — L02.91 ABSCESS: ICD-10-CM

## 2024-01-17 DIAGNOSIS — Z01.818 PREOP GENERAL PHYSICAL EXAM: Primary | ICD-10-CM

## 2024-01-17 PROCEDURE — 99214 OFFICE O/P EST MOD 30 MIN: CPT | Performed by: NURSE PRACTITIONER

## 2024-01-17 RX ORDER — TOPIRAMATE 25 MG/1
TABLET, FILM COATED ORAL
Qty: 74 TABLET | Refills: 0 | Status: SHIPPED | OUTPATIENT
Start: 2024-01-17 | End: 2024-01-17

## 2024-01-17 RX ORDER — BUPROPION HYDROCHLORIDE 300 MG/1
300 TABLET ORAL EVERY MORNING
COMMUNITY
End: 2024-08-12 | Stop reason: DRUGHIGH

## 2024-01-17 RX ORDER — SULFAMETHOXAZOLE/TRIMETHOPRIM 800-160 MG
1 TABLET ORAL 2 TIMES DAILY
Qty: 20 TABLET | Refills: 0 | Status: SHIPPED | OUTPATIENT
Start: 2024-01-17 | End: 2024-01-31

## 2024-01-17 RX ORDER — SULFAMETHOXAZOLE/TRIMETHOPRIM 800-160 MG
1 TABLET ORAL 2 TIMES DAILY
Qty: 20 TABLET | Refills: 0 | Status: SHIPPED | OUTPATIENT
Start: 2024-01-17 | End: 2024-01-17

## 2024-01-17 RX ORDER — TOPIRAMATE 25 MG/1
TABLET, FILM COATED ORAL
Qty: 74 TABLET | Refills: 0 | Status: SHIPPED | OUTPATIENT
Start: 2024-01-17 | End: 2024-03-30 | Stop reason: DRUGHIGH

## 2024-01-17 ASSESSMENT — PATIENT HEALTH QUESTIONNAIRE - PHQ9
SUM OF ALL RESPONSES TO PHQ QUESTIONS 1-9: 17
10. IF YOU CHECKED OFF ANY PROBLEMS, HOW DIFFICULT HAVE THESE PROBLEMS MADE IT FOR YOU TO DO YOUR WORK, TAKE CARE OF THINGS AT HOME, OR GET ALONG WITH OTHER PEOPLE: EXTREMELY DIFFICULT
SUM OF ALL RESPONSES TO PHQ QUESTIONS 1-9: 17

## 2024-01-17 ASSESSMENT — PAIN SCALES - GENERAL: PAINLEVEL: SEVERE PAIN (6)

## 2024-01-17 NOTE — PATIENT INSTRUCTIONS
Preparing for Your Surgery  Getting started  A nurse will call you to review your health history and instructions. They will give you an arrival time based on your scheduled surgery time. Please be ready to share:  Your doctor's clinic name and phone number  Your medical, surgical, and anesthesia history  A list of allergies and sensitivities  A list of medicines, including herbal treatments and over-the-counter drugs  Whether the patient has a legal guardian (ask how to send us the papers in advance)  Please tell us if you're pregnant--or if there's any chance you might be pregnant. Some surgeries may injure a fetus (unborn baby), so they require a pregnancy test. Surgeries that are safe for a fetus don't always need a test, and you can choose whether to have one.   If you have a child who's having surgery, please ask for a copy of Preparing for Your Child's Surgery.    Preparing for surgery  Within 10 to 30 days of surgery: Have a pre-op exam (sometimes called an H&P, or History and Physical). This can be done at a clinic or pre-operative center.  If you're having a , you may not need this exam. Talk to your care team.  At your pre-op exam, talk to your care team about all medicines you take. If you need to stop any medicines before surgery, ask when to start taking them again.  We do this for your safety. Many medicines can make you bleed too much during surgery. Some change how well surgery (anesthesia) drugs work.  Call your insurance company to let them know you're having surgery. (If you don't have insurance, call [phone].)  Call your clinic if there's any change in your health. This includes signs of a cold or flu (sore throat, runny nose, cough, rash, fever). It also includes a scrape or scratch near the surgery site.  If you have questions on the day of surgery, call your hospital or surgery center.  Eating and drinking guidelines  For your safety: Unless your surgeon tells you otherwise,  follow the guidelines below.  Eat and drink as usual until 8 hours before you arrive for surgery. After that, no food or milk.  Drink clear liquids until 2 hours before you arrive. These are liquids you can see through, like water, Gatorade, and Propel Water. They also include plain black coffee and tea (no cream or milk), candy, and breath mints. You can spit out gum when you arrive.  If you drink alcohol: Stop drinking it the night before surgery.  If your care team tells you to take medicine on the morning of surgery, it's okay to take it with a sip of water.  Preventing infection  Shower or bathe the night before and morning of your surgery. Follow the instructions your clinic gave you. (If no instructions, use regular soap.)  Don't shave or clip hair near your surgery site. We'll remove the hair if needed.  Don't smoke or vape the morning of surgery. You may chew nicotine gum up to 2 hours before surgery. A nicotine patch is okay.  Note: Some surgeries require you to completely quit smoking and nicotine. Check with your surgeon.  Your care team will make every effort to keep you safe from infection. We will:  Clean our hands often with soap and water (or an alcohol-based hand rub).  Clean the skin at your surgery site with a special soap that kills germs.  Give you a special gown to keep you warm. (Cold raises the risk of infection.)  Wear special hair covers, masks, gowns and gloves during surgery.  Give antibiotic medicine, if prescribed. Not all surgeries need antibiotics.  What to bring on the day of surgery  Photo ID and insurance card  Copy of your health care directive, if you have one  Glasses and hearing aids (bring cases)  You can't wear contacts during surgery  Inhaler and eye drops, if you use them (tell us about these when you arrive)  CPAP machine or breathing device, if you use them  A few personal items, if spending the night  If you have . . .  A pacemaker, ICD (cardiac defibrillator) or other  implant: Bring the ID card.  An implanted stimulator: Bring the remote control.  A legal guardian: Bring a copy of the certified (court-stamped) guardianship papers.  Please remove any jewelry, including body piercings. Leave jewelry and other valuables at home.  If you're going home the day of surgery  You must have a responsible adult drive you home. They should stay with you overnight as well.  If you don't have someone to stay with you, and you aren't safe to go home alone, we may keep you overnight. Insurance often won't pay for this.  After surgery  If it's hard to control your pain or you need more pain medicine, please call your surgeon's office.  Questions?   If you have any questions for your care team, list them here: _________________________________________________________________________________________________________________________________________________________________________ ____________________________________ ____________________________________ ____________________________________  For informational purposes only. Not to replace the advice of your health care provider. Copyright   2003, 2019 [employer] [facility]. All rights reserved. Clinically reviewed by Bernice Rodriguez MD. [employer] [id number] - REV 12/22.    How to Take Your Medication Before Surgery  - Take all of your medications before surgery as usual

## 2024-01-17 NOTE — PROGRESS NOTES
Preoperative Evaluation  [facility]  [address]  [facility] [address]  Phone: [phone]  Primary Provider: Shannan Saha  Pre-op Performing Provider: SHANNAN SAHA  Jan 17, 2024       Fela is a 28 year old, presenting for the following:  Pre-Op Exam      Surgical Information  Surgery/Procedure: Endometriosis   Surgery Location: [facility]  Surgeon: Dr. Mireles  Surgery Date: 01/24/24  Time of Surgery: tbd  Where patient plans to recover: At home with family  Fax number for surgical facility: Note does not need to be faxed, will be available electronically in Epic.    Assessment & Plan     The proposed surgical procedure is considered INTERMEDIATE risk.    Preop general physical exam  Scheduled for a laparoscopic procedure to evaluate uterine wall and rule out endometriosis  No cardiac history; no chest pain, heart palpitations, shortness of breath. Can participate in strenuous activities/sports such as swimming, singles tennis, football, basketball, and skiing (>10 METs)    Pelvic Pain  Chronic pelvic pain that is intermittent, worse with cycles and intercourse.     Abscess  Has developed a non fluctuant abscess on her chest, no incision and drainage is indicated at this time. Will treat with antibiotics and try to clear it up before surgery  - sulfamethoxazole-trimethoprim (BACTRIM DS) 800-160 MG tablet; Take 1 tablet by mouth 2 times daily    Chronic migraine without aura without status migrainosus, not intractable  Continues to have persistent migraines that can be debilitating, would like to try a daily medication.   - topiramate (TOPAMAX) 25 MG tablet; Take 1 tablet (25 mg) by mouth daily for 14 days, THEN 2 tablets (50 mg) daily for 30 days.      Depression Screening Follow Up        1/17/2024     4:10 PM   PHQ   PHQ-9 Total Score 17   Q9: Thoughts of better off dead/self-harm past 2 weeks Not at all         1/17/2024     4:10 PM   Last PHQ-9   1.   Little interest or pleasure in doing things 2   2.  Feeling down, depressed, or hopeless 2   3.  Trouble falling or staying asleep, or sleeping too much 3   4.  Feeling tired or having little energy 3   5.  Poor appetite or overeating 3   6.  Feeling bad about yourself 2   7.  Trouble concentrating 2   8.  Moving slowly or restless 0   Q9: Thoughts of better off dead/self-harm past 2 weeks 0   PHQ-9 Total Score 17     Is currently taking Wellbutrin, Buspar, and Duloxetine.     Follow Up Actions Taken  Crisis resource information provided in After Visit Summary  Referred patient back to current mental health provider.           - No identified additional risk factors other than previously addressed    Antiplatelet or Anticoagulation Medication Instructions   - Patient is on no antiplatelet or anticoagulation medications.    Additional Medication Instructions  Patient is to take all scheduled medications on the day of surgery    Recommendation  APPROVAL GIVEN to proceed with proposed procedure, without further diagnostic evaluation; as long as the surgeon approves to proceed with surgery depending on the abscess she has on her chest.      Subjective       HPI related to upcoming procedure: history of pelvic pain, worse for cycles and intercourse needs to have further evaluation to evaluate for endometriosis.     Migraines: continues to have regular migraines that can be debilitating. Has tried diet changes, and abortive medications without relief.     Abscess that started that past couple of days in between her breasts, pain, swelling and erythema. No fevers, chills, body aches, malaise or fluctuance to the abscess.         1/14/2024    10:45 AM   Preop Questions   1. Have you ever had a heart attack or stroke? No   2. Have you ever had surgery on your heart or blood vessels, such as a stent placement, a coronary artery bypass, or surgery on an artery in your head, neck, heart, or legs? No   3. Do you have chest pain  with activity? No   4. Do you have a history of  heart failure? No   5. Do you currently have a cold, bronchitis or symptoms of other infection? No   6. Do you have a cough, shortness of breath, or wheezing? No   7. Do you or anyone in your family have previous history of blood clots? YES - mother had a PE after foot surgery   8. Do you or does anyone in your family have a serious bleeding problem such as prolonged bleeding following surgeries or cuts? No   9. Have you ever had problems with anemia or been told to take iron pills? No   10. Have you had any abnormal blood loss such as black, tarry or bloody stools, or abnormal vaginal bleeding? No   11. Have you ever had a blood transfusion? No   12. Are you willing to have a blood transfusion if it is medically needed before, during, or after your surgery? Yes   13. Have you or any of your relatives ever had problems with anesthesia? No   14. Do you have sleep apnea, excessive snoring or daytime drowsiness? No   15. Do you have any artifical heart valves or other implanted medical devices like a pacemaker, defibrillator, or continuous glucose monitor? No   16. Do you have artificial joints? No   17. Are you allergic to latex? No   18. Is there any chance that you may be pregnant? No       Health Care Directive  Patient does not have a Health Care Directive or Living Will: Discussed advance care planning with patient; however, patient declined at this time.    Preoperative Review of    reviewed - no record of controlled substances prescribed.      Status of Chronic Conditions:  See problem list for active medical problems.  Problems all longstanding and stable, except as noted/documented.  See ROS for pertinent symptoms related to these conditions.    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  On hydroxychloroquine, NSAID (naproxen--> meloxicam due to GI upset) started 1/20/2014, methotrexate started 4/3/2014 with 2 week bridge of prednisone (20mg to off). In clinical remission on medication 6/19/2014 and 10/8/2014.  Off all medications soon after 10/8/2014.      Other and unspecified nonspecific immunological findings 01/30/2014     Priority: Medium     BETTY >1:1280, dsDNA 100's (-->43 5/2014), anti-histone antibody (8), otherwise normal follow up labs.  Hydroxychloroquine started 1/30/2014.  After 15 months of minocycline        Past Medical History:   Diagnosis Date    Acne     on minocycline 10/2012-1/2014    Allergic rhinitis     Depressive disorder     Headache(784.0)     tension-type by description    Scoliosis     TLSO bracing for 3 years     No past surgical history on file.  Current Outpatient Medications   Medication Sig Dispense Refill    buPROPion (WELLBUTRIN XL) 150 MG 24 hr tablet Take 150 mg by mouth every morning      busPIRone (BUSPAR) 15 MG tablet       DULoxetine (CYMBALTA) 30 MG capsule Take 20 mg by mouth      famotidine (PEPCID) 20 MG tablet Take 1 tablet (20 mg) by mouth 2 times daily 180 tablet 3    levonorgestrel (MIRENA, 52 MG,) 20 MCG/DAY IUD 1 each by Intrauterine route      rizatriptan (MAXALT-MLT) 10 MG ODT DISSOLVE 1 TABLET (10 MG) BY MOUTH AT ONSET OF HEADACHE FOR MIGRAINE -- MAY REPEAT IN 2 HOURS. MAX 3 TABLETS/24 HOURS. 30 tablet 3       Allergies   Allergen Reactions     "Minocycline Other (See Comments) and Muscle Pain (Myalgia)     Other reaction(s): Arthralgia  HUT Reaction: Arthralgia    Other reaction(s): Arthralgia          Social History     Tobacco Use    Smoking status: Never    Smokeless tobacco: Never   Substance Use Topics    Alcohol use: Yes     Comment: 1-2 times a month     Family History   Problem Relation Age of Onset    Depression Mother     Multiple Sclerosis Father     Thyroid Disease Sister     Depression Sister     Depression Sister     SLE Maternal Grandfather     Crohn's Disease Paternal Grandmother     Arthritis Paternal Grandmother      History   Drug Use Unknown         Objective    There were no vitals taken for this visit.   Estimated body mass index is 35.97 kg/m  as calculated from the following:    Height as of 11/2/23: 1.58 m (5' 2.21\").    Weight as of 11/2/23: 89.8 kg (198 lb).  Review of Systems  CONSTITUTIONAL: NEGATIVE for fever, chills, change in weight  INTEGUMENTARY/SKIN: positive for abscess  EYES: NEGATIVE for vision changes or irritation  ENT/MOUTH: NEGATIVE for ear, mouth and throat problems  RESP: NEGATIVE for significant cough or SOB  CV: NEGATIVE for chest pain, palpitations or peripheral edema  GI: NEGATIVE for nausea, abdominal pain, heartburn, or change in bowel habits  : NEGATIVE for frequency, dysuria, or hematuria  MUSCULOSKELETAL: NEGATIVE for significant arthralgias or myalgia  NEURO: NEGATIVE for weakness, dizziness or paresthesias  ENDOCRINE: NEGATIVE for temperature intolerance, skin/hair changes  HEME: NEGATIVE for bleeding problems  PSYCHIATRIC: NEGATIVE for changes in mood or affect  Physical Exam  GENERAL: alert and no distress  EYES: Eyes grossly normal to inspection, PERRL and conjunctivae and sclerae normal  HENT: ear canals and TM's normal, nose and mouth without ulcers or lesions  NECK: no adenopathy, no asymmetry, masses, or scars  RESP: lungs clear to auscultation - no rales, rhonchi or wheezes  CV: regular rate " and rhythm, normal S1 S2, no S3 or S4, no murmur, click or rub, no peripheral edema  ABDOMEN: soft, nontender, no hepatosplenomegaly, no masses and bowel sounds normal  MS: no gross musculoskeletal defects noted, no edema  SKIN: no suspicious lesions or rashes and 1 cm erythematous abscess that is hard to palpation in between breasts at the sternum  NEURO: Normal strength and tone, mentation intact and speech normal  PSYCH: mentation appears normal, affect normal/bright  LYMPH: no cervical, supraclavicular, axillary, or inguinal adenopathy    Recent Labs   Lab Test 06/14/23  1519   HGB 12.3         POTASSIUM 3.6   CR 0.82        Diagnostics  No labs were ordered during this visit.   No EKG required, no history of coronary heart disease, significant arrhythmia, peripheral arterial disease or other structural heart disease.    Revised Cardiac Risk Index (RCRI)  The patient has the following serious cardiovascular risks for perioperative complications:   - No serious cardiac risks = 0 points     RCRI Interpretation: 0 points: Class I (very low risk - 0.4% complication rate)         Signed Electronically by: RASHEEDA Love CNP  Copy of this evaluation report is provided to requesting physician.      Answers submitted by the patient for this visit:  Patient Health Questionnaire (Submitted on 1/17/2024)  If you checked off any problems, how difficult have these problems made it for you to do your work, take care of things at home, or get along with other people?: Extremely difficult  PHQ9 TOTAL SCORE: 17

## 2024-01-18 ENCOUNTER — TELEPHONE (OUTPATIENT)
Dept: OBGYN | Facility: CLINIC | Age: 29
End: 2024-01-18
Payer: COMMERCIAL

## 2024-01-18 NOTE — TELEPHONE ENCOUNTER
[facility]    Phone Message    May a detailed message be left on voicemail: yes     Reason for Call: Other: Pt calling stating she is currently taking antibiotics for a skin infection. Pt is wanting to verify that her taking antibiotics wont interfere with her upcoming surgery with Aline. Please call pt      Action Taken: Message routed to:  Other: S    Travel Screening: Not Applicable

## 2024-01-18 NOTE — TELEPHONE ENCOUNTER
I did forward the message to Dr. Mireles to inquire if this patient can take her antibiotics or does she need to stop them.     Dr. Mireles wrote me back and stated:    The note is not yet finished so I am unable to see details of the exam and place of abscess they are concerned about. She should continue the antibiotics on the day of surgery but if there is an active infection at the time of surgery in the surgical field I would recommend delaying surgery. I will wait for the details of the note.     I will let the patient know to continue her antibiotics and once I hear back from the doctor with any different instructions we will let her know.

## 2024-01-23 ENCOUNTER — OFFICE VISIT (OUTPATIENT)
Dept: FAMILY MEDICINE | Facility: CLINIC | Age: 29
End: 2024-01-23
Payer: COMMERCIAL

## 2024-01-23 VITALS
SYSTOLIC BLOOD PRESSURE: 115 MMHG | HEART RATE: 81 BPM | WEIGHT: 192 LBS | TEMPERATURE: 98.6 F | BODY MASS INDEX: 35.33 KG/M2 | OXYGEN SATURATION: 100 % | HEIGHT: 62 IN | DIASTOLIC BLOOD PRESSURE: 77 MMHG

## 2024-01-23 DIAGNOSIS — J86.9 ABSCESS OF CHEST (H): Primary | ICD-10-CM

## 2024-01-23 PROCEDURE — 99213 OFFICE O/P EST LOW 20 MIN: CPT | Performed by: NURSE PRACTITIONER

## 2024-01-23 ASSESSMENT — PAIN SCALES - GENERAL: PAINLEVEL: SEVERE PAIN (6)

## 2024-01-26 ASSESSMENT — SLEEP AND FATIGUE QUESTIONNAIRES
HOW LIKELY ARE YOU TO NOD OFF OR FALL ASLEEP WHILE SITTING INACTIVE IN A PUBLIC PLACE: HIGH CHANCE OF DOZING
HOW LIKELY ARE YOU TO NOD OFF OR FALL ASLEEP WHEN YOU ARE A PASSENGER IN A CAR FOR AN HOUR WITHOUT A BREAK: MODERATE CHANCE OF DOZING
HOW LIKELY ARE YOU TO NOD OFF OR FALL ASLEEP WHILE SITTING QUIETLY AFTER LUNCH WITHOUT ALCOHOL: HIGH CHANCE OF DOZING
HOW LIKELY ARE YOU TO NOD OFF OR FALL ASLEEP IN A CAR, WHILE STOPPED FOR A FEW MINUTES IN TRAFFIC: WOULD NEVER DOZE
HOW LIKELY ARE YOU TO NOD OFF OR FALL ASLEEP WHILE LYING DOWN TO REST IN THE AFTERNOON WHEN CIRCUMSTANCES PERMIT: HIGH CHANCE OF DOZING
HOW LIKELY ARE YOU TO NOD OFF OR FALL ASLEEP WHILE SITTING AND READING: HIGH CHANCE OF DOZING
HOW LIKELY ARE YOU TO NOD OFF OR FALL ASLEEP WHILE SITTING AND TALKING TO SOMEONE: MODERATE CHANCE OF DOZING
HOW LIKELY ARE YOU TO NOD OFF OR FALL ASLEEP WHILE WATCHING TV: HIGH CHANCE OF DOZING

## 2024-01-31 ENCOUNTER — OFFICE VISIT (OUTPATIENT)
Dept: SLEEP MEDICINE | Facility: CLINIC | Age: 29
End: 2024-01-31
Payer: COMMERCIAL

## 2024-01-31 ENCOUNTER — LAB (OUTPATIENT)
Dept: LAB | Facility: CLINIC | Age: 29
End: 2024-01-31
Payer: COMMERCIAL

## 2024-01-31 VITALS
WEIGHT: 199 LBS | HEART RATE: 63 BPM | DIASTOLIC BLOOD PRESSURE: 71 MMHG | BODY MASS INDEX: 36.62 KG/M2 | OXYGEN SATURATION: 100 % | SYSTOLIC BLOOD PRESSURE: 122 MMHG | HEIGHT: 62 IN

## 2024-01-31 DIAGNOSIS — G47.10 HYPERSOMNOLENCE: ICD-10-CM

## 2024-01-31 DIAGNOSIS — F33.1 MAJOR DEPRESSIVE DISORDER, RECURRENT EPISODE, MODERATE (H): ICD-10-CM

## 2024-01-31 DIAGNOSIS — E61.1 DIETARY IRON DEFICIENCY: ICD-10-CM

## 2024-01-31 DIAGNOSIS — F41.1 GENERALIZED ANXIETY DISORDER: ICD-10-CM

## 2024-01-31 DIAGNOSIS — G47.419 NARCOLEPSY WITHOUT CATAPLEXY(347.00): ICD-10-CM

## 2024-01-31 DIAGNOSIS — E61.1 DIETARY IRON DEFICIENCY: Primary | ICD-10-CM

## 2024-01-31 PROBLEM — E66.812 CLASS 2 SEVERE OBESITY DUE TO EXCESS CALORIES WITH SERIOUS COMORBIDITY IN ADULT (H): Status: ACTIVE | Noted: 2024-01-31

## 2024-01-31 PROBLEM — K21.9 GASTROESOPHAGEAL REFLUX DISEASE WITHOUT ESOPHAGITIS: Status: ACTIVE | Noted: 2020-06-30

## 2024-01-31 PROBLEM — K58.1 IRRITABLE BOWEL SYNDROME WITH CONSTIPATION: Status: ACTIVE | Noted: 2017-08-14

## 2024-01-31 PROBLEM — J35.01 CHRONIC TONSILLITIS: Status: ACTIVE | Noted: 2018-02-19

## 2024-01-31 PROBLEM — E66.01 CLASS 2 SEVERE OBESITY DUE TO EXCESS CALORIES WITH SERIOUS COMORBIDITY IN ADULT (H): Status: ACTIVE | Noted: 2024-01-31

## 2024-01-31 PROBLEM — S94.31XA: Status: ACTIVE | Noted: 2022-01-27

## 2024-01-31 PROBLEM — Z00.00 HEALTH CARE MAINTENANCE: Status: RESOLVED | Noted: 2023-03-24 | Resolved: 2024-01-31

## 2024-01-31 LAB
AMPHETAMINES UR QL SCN: ABNORMAL
BARBITURATES UR QL SCN: ABNORMAL
BENZODIAZ UR QL SCN: ABNORMAL
BZE UR QL SCN: ABNORMAL
CANNABINOIDS UR QL SCN: ABNORMAL
FENTANYL UR QL: ABNORMAL
FERRITIN SERPL-MCNC: 9 NG/ML (ref 6–175)
IRON BINDING CAPACITY (ROCHE): 371 UG/DL (ref 240–430)
IRON SATN MFR SERPL: 8 % (ref 15–46)
IRON SERPL-MCNC: 31 UG/DL (ref 37–145)
OPIATES UR QL SCN: ABNORMAL
PCP QUAL URINE (ROCHE): ABNORMAL

## 2024-01-31 PROCEDURE — 99205 OFFICE O/P NEW HI 60 MIN: CPT | Performed by: INTERNAL MEDICINE

## 2024-01-31 PROCEDURE — 36415 COLL VENOUS BLD VENIPUNCTURE: CPT

## 2024-01-31 PROCEDURE — 80307 DRUG TEST PRSMV CHEM ANLYZR: CPT

## 2024-01-31 PROCEDURE — 82728 ASSAY OF FERRITIN: CPT

## 2024-01-31 PROCEDURE — 83550 IRON BINDING TEST: CPT

## 2024-01-31 PROCEDURE — 83540 ASSAY OF IRON: CPT

## 2024-01-31 NOTE — PROGRESS NOTES
Name: Fela Solorzano MRN# [id number]   Age: 28 year old YOB: 1995     Date of Consultation: January 31, 2024  Consultation is requested by: More Cespedes PsyD  No address on file More Cespedes  Primary care provider: Shannan Chacko       Reason for Sleep Consult:     Ab  Fela Solorzano main reason for visit: Innatention, symptoms similar to adhd but no adhd diagnosis after testing so referred for sleep disorder evaluation  Patient states problem(s) started: 2019  Fela Solorzano's goals for this visit: Figure out next steps for possible testing or determine if testing is appropriate           Assessment and Plan:     Summary Sleep Diagnoses:  Insomnia (FRANSICO 21)  RLS  Narcolepsy sleep wake disorder  Hypersomnolence (ESS 19) with long sleep requirement  Home night shift call (rare)  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 She feels weakness or heaviness of arms with laughter 2x/month although others do not notice these changes; denies visual hypnagogic hallucinations or sleep paralysis.              SLEEP-WAKE SCHEDULE:      Work/School Days:9.5 hour sleep opportunity with multiple awakenings   Patient goes to school/work: Yes   Usually gets into bed at 8pm  Takes patient about 30 minutes - 1 hour to fall asleep  Has trouble falling asleep 1 nights per week  Wakes up in the middle of the night  2-3 times.  Wakes up due to External stimuli (bed partner, pets, noise, etc), Use the bathroom, Anxiety, Uncertain  She has trouble falling back asleep 2-3 times a week.   It usually takes 15 minutes on a good night, 1-2 hours on a bad night to get back to sleep  Patient is usually up at 5:30am  Uses alarm: Yes    Weekends/Non-work Days/All Other Days:9-11 hours sleep opportunity  Usually gets into bed at 10pm   Takes patient about 30 min - 1 hour to fall asleep  Patient is usually up at 8am-10am  Uses alarm: Yes    Sleep Need  Patient gets  5-6 hours sleep on average   Patient thinks she needs about 7-8 hours sleep    Fela Solorzano prefers to sleep in this position(s): Stomach   Patient states they do the following activities in bed: Read, Eat, Watch TV, Use phone, computer, or tablet    Naps  Patient takes a purposeful nap About 2-3 times per month times a week and naps are usually Multiple hours long in duration  She feels better after a nap: No  She dozes off unintentionally 0 days per week  Patient has had a driving accident or near-miss due to sleepiness/drowsiness: No      SLEEP DISRUPTIONS:    Breathing/Snoring    Snoring:No  Other people complain about her snoring: No  Others observeshe stops breathing in her sleep: No  She has issues with the following: Morning headaches, Stuffy nose when you wake up, Heartburn or reflux at night, Getting up to urinate more than once    Movement: ferritin 11 iron 10%   2023    Pain, discomfort, with an urge to move:  Yes  Happens when she is resting:  Yes  Urge to move changes position, moves to different bed 1x/2 weeks  Happens more at night:  No  Patient has been told she kicks her legs at night:  No     Behaviors in Wakefulness/Sleep:    Dream enactment   No  Sleep eating   No  Bruxism  No                    Fela Solorzano has experienced the following behaviors while sleeping: Sleep-talking, Sleepwalking, Teeth grinding, Night terrors (screaming,yelling or  acting afraid but not recalling event), See or hear things that are not really there upon awakening or just falling asleep  She has experienced sudden muscle weakness during the day: Yes      Is there anything else you would like your sleep provider to know: I never feel rested when waking up in the morning. I am almost always tired. If I m at rest and not doing something to keep me occupied I feel so sleepy and afraid that I ll fall asleep that I have to get up and move around.      CAFFEINE AND OTHER SUBSTANCES:    Patient consumes caffeinated beverages per day:  1-2  Last caffeine use is usually: Morning, sometimes afternoon  List of any prescribed or over the counter stimulants that patient takes: None  List of any prescribed or over the counter sleep medication patient takes: None  List of previous sleep medications that patient has tried: Melatonin  Patient drinks alcohol to help them sleep: No  Patient drinks alcohol near bedtime: No    Family History:  Patient has a family member been diagnosed with a sleep disorder: No                 SCALES:       EPWORTH SLEEPINESS SCALE         1/26/2024     3:04 PM    Webster Sleepiness Scale ( DONELL Couch  [id number]<br>ESS - [place]/English - Final version - 21 Nov 07 - [facility] [place].)   Sitting and reading High chance of dozing   Watching TV High chance of dozing   Sitting, inactive in a public place (e.g. a theatre or a meeting) High chance of dozing   As a passenger in a car for an hour without a break Moderate chance of dozing   Lying down to rest in the afternoon when circumstances permit High chance of dozing   Sitting and talking to someone Moderate chance of dozing   Sitting quietly after a lunch without alcohol High chance of dozing   In a car, while stopped for a few minutes in traffic Would never doze   Webster Score (MC) 19   Webster Score (Sleep) 19         INSOMNIA SEVERITY INDEX (FRANSICO)          1/26/2024     2:50 PM   Insomnia Severity Index (FRANSICO)    Difficulty falling asleep 2   Difficulty staying asleep 3   Problems waking up too early 3   How SATISFIED/DISSATISFIED are you with your CURRENT sleep pattern? 3   How NOTICEABLE to others do you think your sleep problem is in terms of impairing the quality of your life? 3   How WORRIED/DISTRESSED are you about your current sleep problem? 3   To what extent do you consider your sleep problem to INTERFERE with your daily functioning (e.g. daytime fatigue, mood, ability to function at work/daily chores, concentration, memory, mood, etc.) CURRENTLY? 4   FRANSICO Total Score 21       Guidelines for Scoring/Interpretation:  Total score categories:  0-7 = No clinically significant insomnia   8-14 = Subthreshold insomnia   15-21 = Clinical insomnia (moderate severity)  22-28 = Clinical insomnia (severe)  Used via courtesy of www.[place].comth.va.gov with permission from Martir Rene PhD., [facility]      STOP BANG         1/26/2024     3:05 PM   STOP BANG Questionnaire (  2008, the American Society of Anesthesiologists, Inc. [employer]   1. Snoring - Do you snore loudly (louder than talking or loud enough to be heard through closed doors)? No   2. Tired - Do you often feel tired, fatigued, or sleepy during daytime? Yes   3. Observed - Has anyone observed you stop breathing during your sleep? No   4. Blood pressure - Do you have or are you being treated for high blood pressure? No   5. BMI - BMI more than 35 kg/m2? Yes   6. Age - Age over 50 yr old? No   7. Neck circumference - Neck circumference greater than 40 cm? No   8. Gender - Gender male? No   STOP BANG Score (MC): 3 (High risk of LUIS M)         GAD7        11/1/2023    10:20 AM   HAYLEY-7    1. Feeling nervous, anxious, or on edge 1   2. Not being able to stop or control worrying 1   3. Worrying too much about different things 2   4. Trouble relaxing 2   5. Being so restless that it is hard to sit still 2   6. Becoming easily annoyed or  "irritable 2   7. Feeling afraid, as if something awful might happen 1   HAYLEY-7 Total Score 11         CAGE-AID        10/25/2023     9:27 AM   CAGE-AID Flowsheet   Have you ever felt you should Cut down on your drinking or drug use? 0   Have people Annoyed you by criticizing your drinking or drug use? 0   Have you ever felt bad or Guilty about your drinking or drug use? 0   Have you ever had a drink or used drugs first thing in the morning to steady your nerves or to get rid of a hangover? (Eye opener) 0   CAGE-AID SCORE 0   Have you ever felt you should Cut down on your drinking or drug use? No   Have people Annoyed you by criticizing your drinking or drug use? No   Have you ever felt bad or Guilty about your drinking or drug use? No   Have you ever had a drink or used drugs first thing in the morning to steady your nerves or to get rid of a hangover? (Eye opener) No   CAGE-AID SCORE 0 (A total score of 2 or greater is considered clinically significant)       CAGE-AID reprinted with permission from the [place] Medical Journal, SADI Carl. and DEON Marques, \"Conjoint screening questionnaires for alcohol and drug abuse\" [place] Medical Journal 94: 135-140, 1995.      PATIENT HEALTH QUESTIONNAIRE-9 (PHQ - 9)        1/17/2024     4:10 PM   PHQ-9 ([employer])   1.  Little interest or pleasure in doing things 2   2.  Feeling down, depressed, or hopeless 2   3.  Trouble falling or staying asleep, or sleeping too much 3   4.  Feeling tired or having little energy 3   5.  Poor appetite or overeating 3   6.  Feeling bad about yourself - or that you are a failure or have let yourself or your family down 2   7.  Trouble concentrating on things, such as reading the newspaper or watching television 2   8.  Moving or speaking so slowly that other people could have noticed. Or the opposite - being so fidgety or restless that you have been moving around a lot more than usual 0   9.  Thoughts that you would be better off dead, or of " hurting yourself in some way 0   PHQ-9 Total Score 17   6.  Feeling bad about yourself 2   7.  Trouble concentrating 2   8.  Moving slowly or restless 0   9.  Suicidal or self-harm thoughts 0   1.  Little interest or pleasure in doing things More than half the days   2.  Feeling down, depressed, or hopeless More than half the days   3.  Trouble falling or staying asleep, or sleeping too much Nearly every day   4.  Feeling tired or having little energy Nearly every day   5.  Poor appetite or overeating Nearly every day   6.  Feeling bad about yourself More than half the days   7.  Trouble concentrating More than half the days   8.  Moving slowly or restless Not at all   9.  Suicidal or self-harm thoughts Not at all   PHQ-9 via [facility] TOTAL SCORE-----> 17 (Moderately severe depression)   Difficulty at work, home, or with people Extremely difficult       Developed by Kaleigh Balderas, Melissa Link, Steve Oliveira and colleagues, with an educational christian from [employer] No permission required to reproduce, translate, display or distribute.        Allergies:    Allergies   Allergen Reactions    Minocycline Other (See Comments) and Muscle Pain (Myalgia)     Other reaction(s): Arthralgia  HUT Reaction: Arthralgia    Other reaction(s): Arthralgia              Problem List:     Patient Active Problem List   Diagnosis    Other and unspecified nonspecific immunological findings    Polyarticular arthritis    Minocycline adverse reaction    Health care maintenance    Inattention    Vegetarian diet    Gastroesophageal reflux disease with esophagitis without hemorrhage    Alopecia    Chronic migraine without aura without status migrainosus, not intractable    Pelvic pain            MEDICATIONS:     Current Outpatient Medications   Medication Sig Dispense Refill    buPROPion (WELLBUTRIN XL) 300 MG 24 hr tablet Take 300 mg by mouth every morning      busPIRone (BUSPAR) 15 MG tablet       DULoxetine (CYMBALTA) 30 MG  capsule Take 20 mg by mouth      famotidine (PEPCID) 20 MG tablet Take 1 tablet (20 mg) by mouth 2 times daily 180 tablet 3    levonorgestrel (MIRENA, 52 MG,) 20 MCG/DAY IUD 1 each by Intrauterine route      rizatriptan (MAXALT-MLT) 10 MG ODT DISSOLVE 1 TABLET (10 MG) BY MOUTH AT ONSET OF HEADACHE FOR MIGRAINE -- MAY REPEAT IN 2 HOURS. MAX 3 TABLETS/24 HOURS. 30 tablet 3    sulfamethoxazole-trimethoprim (BACTRIM DS) 800-160 MG tablet Take 1 tablet by mouth 2 times daily 20 tablet 0    topiramate (TOPAMAX) 25 MG tablet Take 1 tablet (25 mg) by mouth daily for 14 days, THEN 2 tablets (50 mg) daily for 30 days. 74 tablet 0       Problem List:  Patient Active Problem List    Diagnosis Date Noted    Pelvic pain 09/26/2023     Priority: Medium    Chronic migraine without aura without status migrainosus, not intractable 06/23/2023     Priority: Medium    Health care maintenance 03/24/2023     Priority: Medium    Inattention 03/24/2023     Priority: Medium    Vegetarian diet 03/24/2023     Priority: Medium    Gastroesophageal reflux disease with esophagitis without hemorrhage 03/24/2023     Priority: Medium    Alopecia 03/24/2023     Priority: Medium    Minocycline adverse reaction 10/08/2014     Priority: Medium    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  On hydroxychloroquine, NSAID (naproxen--> meloxicam due to GI upset) started 1/20/2014, methotrexate started 4/3/2014 with 2 week bridge of prednisone (20mg to off). In clinical remission on medication 6/19/2014 and 10/8/2014.  Off all medications soon after 10/8/2014.      Other and unspecified nonspecific immunological findings 01/30/2014     Priority: Medium     BETTY >1:1280, dsDNA 100's (-->43 5/2014), anti-histone antibody (8), otherwise normal follow up labs.  Hydroxychloroquine started 1/30/2014.  After 15 months of minocycline          Past Medical/Surgical History:  Past  Medical History:   Diagnosis Date    Acne     on minocycline 10/2012-1/2014    Allergic rhinitis     Depressive disorder     Headache(784.0)     tension-type by description    Scoliosis     TLSO bracing for 3 years     No past surgical history on file.    Social History:  Social History     Socioeconomic History    Marital status: Single     Spouse name: Not on file    Number of children: Not on file    Years of education: Not on file    Highest education level: Not on file   Occupational History    Not on file   Tobacco Use    Smoking status: Never    Smokeless tobacco: Never   Vaping Use    Vaping Use: Never used   Substance and Sexual Activity    Alcohol use: Yes     Comment: 1-2 times a month    Drug use: Never    Sexual activity: Yes     Partners: Male     Birth control/protection: I.U.D.   Other Topics Concern    Not on file   Social History Narrative    Not on file     Social Determinants of Health     Financial Resource Strain: Low Risk  (1/14/2024)    Financial Resource Strain     Within the past 12 months, have you or your family members you live with been unable to get utilities (heat, electricity) when it was really needed?: No   Food Insecurity: Low Risk  (1/14/2024)    Food Insecurity     Within the past 12 months, did you worry that your food would run out before you got money to buy more?: No     Within the past 12 months, did the food you bought just not last and you didn t have money to get more?: No   Transportation Needs: Low Risk  (1/14/2024)    Transportation Needs     Within the past 12 months, has lack of transportation kept you from medical appointments, getting your medicines, non-medical meetings or appointments, work, or from getting things that you need?: No   Physical Activity: Not on file   Stress: Not on file   Social Connections: Not on file   Interpersonal Safety: Low Risk  (11/2/2023)    Interpersonal Safety     Do you feel physically and emotionally safe where you currently live?:  Yes     Within the past 12 months, have you been hit, slapped, kicked or otherwise physically hurt by someone?: No     Within the past 12 months, have you been humiliated or emotionally abused in other ways by your partner or ex-partner?: No   Housing Stability: Low Risk  (1/14/2024)    Housing Stability     Do you have housing? : Yes     Are you worried about losing your housing?: No       Family History:  Family History   Problem Relation Age of Onset    Depression Mother     Multiple Sclerosis Father     Thyroid Disease Sister     Depression Sister     Depression Sister     SLE Maternal Grandfather     Crohn's Disease Paternal Grandmother     Arthritis Paternal Grandmother        Review of Systems:  A complete review of systems reviewed by me is negative with the exeption of what has been mentioned in the history of present illness.  In the last TWO WEEKS have you experienced any of the following symptoms?  Fevers: No  Night Sweats: No  Weight Gain: No  Pain at Night: No  Double Vision: No  Changes in Vision: No  Difficulty Breathing through Nose: No  Sore Throat in Morning: No  Dry Mouth in the Morning: No  Shortness of Breath Lying Flat: Yes  Shortness of Breath With Activity: No  Awakening with Shortness of Breath: No  Increased Cough: No  Heart Racing at Night: No  Swelling in Feet or Legs: No  Diarrhea at Night: No  Heartburn at Night: Yes  Urinating More than Once at Night: Yes  Losing Control of Urine at Night: No  Joint Pains at Night: No  Headaches in Morning: No  Weakness in Arms or Legs: No  Depressed Mood: No  Anxiety: Yes          Physical Examination:   Freidman 1  Normal mandibular position    GENERAL: alert and no distress  EYES: Eyes grossly normal to inspection.  No discharge or erythema, or obvious scleral/conjunctival abnormalities.  RESP: No audible wheeze, cough, or visible cyanosis.    SKIN: Visible skin clear. No significant rash, abnormal pigmentation or lesions.  NEURO: Cranial nerves  "grossly intact.  Mentation and speech appropriate for age.  PSYCH: Appropriate affect, tone, and pace of words                  Data: All pertinent previous laboratory data reviewed     Recent Labs   Lab Test 06/14/23  1519      POTASSIUM 3.6   CHLORIDE 104   CO2 26   ANIONGAP 11   GLC 81   BUN 14.5   CR 0.82   TOMMY 9.8       Recent Labs   Lab Test 06/14/23  1519   WBC 3.9*   RBC 4.59   HGB 12.3   HCT 36.4   MCV 79   MCH 26.8   MCHC 33.8   RDW 13.1          Recent Labs   Lab Test 06/14/23  1519   PROTTOTAL 7.5   ALBUMIN 4.7   BILITOTAL 0.3   ALKPHOS 69   AST 23   ALT 16       TSH   Date Value   06/14/2023 4.09 uIU/mL   01/30/2014 1.04 mU/L       No results found for: \"UAMP\", \"UBARB\", \"BENZODIAZEUR\", \"UCANN\", \"UCOC\", \"OPIT\", \"UPCP\"    Iron Sat Index   Date/Time Value Ref Range Status   06/14/2023 03:19 PM 10 (L) 15 - 46 % Final     Ferritin   Date/Time Value Ref Range Status   06/14/2023 03:19 PM 11 6 - 175 ng/mL Final   01/30/2014 11:30 AM 27 10 - 120 ng/mL Final         MARYA BERRY MD 1/31/2024     Total time spent reviewing medical records including previous testing and interpretation as well as direct patient contact and documentation on this date: 60 min    "

## 2024-01-31 NOTE — PATIENT INSTRUCTIONS

## 2024-01-31 NOTE — NURSING NOTE
UA collected and sent to lab.  Pt went to lab also to have blood draw.  Pt would like to have MSLT/Acti done at [place].   Pt info sent to [facility] at [place].      WILFRIDO Saucedo

## 2024-02-01 ENCOUNTER — ANESTHESIA EVENT (OUTPATIENT)
Dept: SURGERY | Facility: AMBULATORY SURGERY CENTER | Age: 29
End: 2024-02-01
Payer: COMMERCIAL

## 2024-02-01 ENCOUNTER — TELEPHONE (OUTPATIENT)
Dept: SLEEP MEDICINE | Facility: CLINIC | Age: 29
End: 2024-02-01
Payer: COMMERCIAL

## 2024-02-01 RX ORDER — NALOXONE HYDROCHLORIDE 0.4 MG/ML
0.2 INJECTION, SOLUTION INTRAMUSCULAR; INTRAVENOUS; SUBCUTANEOUS
Status: CANCELLED | OUTPATIENT
Start: 2024-02-01

## 2024-02-01 RX ORDER — ONDANSETRON 4 MG/1
4 TABLET, ORALLY DISINTEGRATING ORAL EVERY 30 MIN PRN
Status: CANCELLED | OUTPATIENT
Start: 2024-02-01

## 2024-02-01 RX ORDER — LIDOCAINE 40 MG/G
CREAM TOPICAL
Status: CANCELLED | OUTPATIENT
Start: 2024-02-01

## 2024-02-01 RX ORDER — FENTANYL CITRATE 50 UG/ML
25 INJECTION, SOLUTION INTRAMUSCULAR; INTRAVENOUS EVERY 5 MIN PRN
Status: CANCELLED | OUTPATIENT
Start: 2024-02-01

## 2024-02-01 RX ORDER — SODIUM CHLORIDE, SODIUM LACTATE, POTASSIUM CHLORIDE, CALCIUM CHLORIDE 600; 310; 30; 20 MG/100ML; MG/100ML; MG/100ML; MG/100ML
INJECTION, SOLUTION INTRAVENOUS CONTINUOUS
Status: CANCELLED | OUTPATIENT
Start: 2024-02-01

## 2024-02-01 RX ORDER — HYDRALAZINE HYDROCHLORIDE 20 MG/ML
2.5-5 INJECTION INTRAMUSCULAR; INTRAVENOUS EVERY 10 MIN PRN
Status: CANCELLED | OUTPATIENT
Start: 2024-02-01

## 2024-02-01 RX ORDER — FLUMAZENIL 0.1 MG/ML
0.2 INJECTION, SOLUTION INTRAVENOUS
Status: CANCELLED | OUTPATIENT
Start: 2024-02-01

## 2024-02-01 RX ORDER — NALOXONE HYDROCHLORIDE 0.4 MG/ML
0.4 INJECTION, SOLUTION INTRAMUSCULAR; INTRAVENOUS; SUBCUTANEOUS
Status: CANCELLED | OUTPATIENT
Start: 2024-02-01

## 2024-02-01 RX ORDER — OXYCODONE HYDROCHLORIDE 5 MG/1
5 TABLET ORAL
Status: CANCELLED | OUTPATIENT
Start: 2024-02-01

## 2024-02-01 RX ORDER — ONDANSETRON 2 MG/ML
4 INJECTION INTRAMUSCULAR; INTRAVENOUS EVERY 30 MIN PRN
Status: CANCELLED | OUTPATIENT
Start: 2024-02-01

## 2024-02-01 RX ORDER — ACETAMINOPHEN 325 MG/1
975 TABLET ORAL ONCE
Status: CANCELLED | OUTPATIENT
Start: 2024-02-01 | End: 2024-02-01

## 2024-02-01 RX ORDER — OXYCODONE HYDROCHLORIDE 5 MG/1
10 TABLET ORAL
Status: CANCELLED | OUTPATIENT
Start: 2024-02-01

## 2024-02-01 RX ORDER — LABETALOL HYDROCHLORIDE 5 MG/ML
10 INJECTION, SOLUTION INTRAVENOUS
Status: CANCELLED | OUTPATIENT
Start: 2024-02-01

## 2024-02-01 RX ORDER — HYDROMORPHONE HYDROCHLORIDE 1 MG/ML
0.2 INJECTION, SOLUTION INTRAMUSCULAR; INTRAVENOUS; SUBCUTANEOUS EVERY 5 MIN PRN
Status: CANCELLED | OUTPATIENT
Start: 2024-02-01

## 2024-02-01 RX ORDER — FENTANYL CITRATE 50 UG/ML
50 INJECTION, SOLUTION INTRAMUSCULAR; INTRAVENOUS EVERY 5 MIN PRN
Status: CANCELLED | OUTPATIENT
Start: 2024-02-01

## 2024-02-01 RX ORDER — HYDROMORPHONE HYDROCHLORIDE 1 MG/ML
0.4 INJECTION, SOLUTION INTRAMUSCULAR; INTRAVENOUS; SUBCUTANEOUS EVERY 5 MIN PRN
Status: CANCELLED | OUTPATIENT
Start: 2024-02-01

## 2024-02-01 RX ORDER — FENTANYL CITRATE 50 UG/ML
25-50 INJECTION, SOLUTION INTRAMUSCULAR; INTRAVENOUS
Status: CANCELLED | OUTPATIENT
Start: 2024-02-01

## 2024-02-01 NOTE — ANESTHESIA PREPROCEDURE EVALUATION
Anesthesia Pre-Procedure Evaluation    Patient: Fela Solorzano   MRN: [id number] : 1995        Procedure : Procedure(s):  Diagnostic laparoscopy, pap smear, possible excision of endometriosis          Past Medical History:   Diagnosis Date    Acne     on minocycline 10/2012-2014    Allergic rhinitis     Depressive disorder     Headache(784.0)     tension-type by description    Scoliosis     TLSO bracing for 3 years      No past surgical history on file.   Allergies   Allergen Reactions    Minocycline Other (See Comments) and Muscle Pain (Myalgia)     Other reaction(s): Arthralgia  HUT Reaction: Arthralgia    Other reaction(s): Arthralgia        Social History     Tobacco Use    Smoking status: Never    Smokeless tobacco: Never   Substance Use Topics    Alcohol use: Yes     Comment: 1-2 times a month      Wt Readings from Last 1 Encounters:   24 90.3 kg (199 lb)        Anesthesia Evaluation            ROS/MED HX  ENT/Pulmonary:  - neg pulmonary ROS     Neurologic:     (+)      migraines,                          Cardiovascular:  - neg cardiovascular ROS     METS/Exercise Tolerance:     Hematologic:  - neg hematologic  ROS     Musculoskeletal: Comment: Scoliosis - neg musculoskeletal ROS     GI/Hepatic:     (+) GERD,      Inflammatory bowel disease,             Renal/Genitourinary:  - neg Renal ROS     Endo:     (+)               Obesity,       Psychiatric/Substance Use:     (+) psychiatric history anxiety and depression       Infectious Disease: Comment: Chest abscess - on antibiotics      Malignancy:       Other:            Physical Exam    Airway  airway exam normal      Mallampati: I   TM distance: > 3 FB   Neck ROM: full   Mouth opening: > 3 cm    Respiratory Devices and Support         Dental       (+) Minor Abnormalities - some fillings, tiny chips      Cardiovascular   cardiovascular exam normal          Pulmonary   pulmonary exam normal                OUTSIDE LABS:  CBC:   Lab Results  "  Component Value Date    WBC 3.9 (L) 06/14/2023    WBC 3.5 (A) 08/06/2015    HGB 12.3 06/14/2023    HGB 13.3 08/06/2015    HCT 36.4 06/14/2023    HCT 41.2 07/09/2015     06/14/2023     08/06/2015     BMP:   Lab Results   Component Value Date     06/14/2023    POTASSIUM 3.6 06/14/2023    CHLORIDE 104 06/14/2023    CO2 26 06/14/2023    BUN 14.5 06/14/2023    CR 0.82 06/14/2023    CR 0.84 07/09/2015    GLC 81 06/14/2023     COAGS:   Lab Results   Component Value Date    PTT 32 01/30/2014    INR 1.16 (H) 01/30/2014     POC: No results found for: \"BGM\", \"HCG\", \"HCGS\"  HEPATIC:   Lab Results   Component Value Date    ALBUMIN 4.7 06/14/2023    PROTTOTAL 7.5 06/14/2023    ALT 16 06/14/2023    AST 23 06/14/2023    ALKPHOS 69 06/14/2023    BILITOTAL 0.3 06/14/2023     OTHER:   Lab Results   Component Value Date    TOMMY 9.8 06/14/2023    TSH 4.09 06/14/2023    CRP 15.0 (H) 07/09/2015    SED 7 06/14/2023       Anesthesia Plan    ASA Status:  2    NPO Status:  NPO Appropriate    Anesthesia Type: General.     - Airway: ETT   Induction: Intravenous, Propofol.   Maintenance: TIVA.        Consents    Anesthesia Plan(s) and associated risks, benefits, and realistic alternatives discussed. Questions answered and patient/representative(s) expressed understanding.     - Discussed: Risks, Benefits and Alternatives for the PROCEDURE were discussed     - Discussed with:  Patient            Postoperative Care    Pain management: Oral pain medications, IV analgesics, Multi-modal analgesia.   PONV prophylaxis: Ondansetron (or other 5HT-3), Dexamethasone or Solumedrol, Background Propofol Infusion     Comments:               Baldemar Moctezuma MD    I have reviewed the pertinent notes and labs in the chart from the past 30 days and (re)examined the patient.  Any updates or changes from those notes are reflected in this note.              # Obesity: Estimated body mass index is 36.39 kg/m  as calculated from the following:    " "Height as of 1/31/24: 1.575 m (5' 2.01\").    Weight as of 1/31/24: 90.3 kg (199 lb).      "

## 2024-02-02 ENCOUNTER — HOSPITAL ENCOUNTER (OUTPATIENT)
Facility: AMBULATORY SURGERY CENTER | Age: 29
Discharge: HOME | End: 2024-02-02
Attending: STUDENT IN AN ORGANIZED HEALTH CARE EDUCATION/TRAINING PROGRAM | Admitting: STUDENT IN AN ORGANIZED HEALTH CARE EDUCATION/TRAINING PROGRAM
Payer: COMMERCIAL

## 2024-02-02 ENCOUNTER — TELEPHONE (OUTPATIENT)
Dept: SURGERY | Facility: CLINIC | Age: 29
End: 2024-02-02

## 2024-02-02 ENCOUNTER — ANESTHESIA (OUTPATIENT)
Dept: SURGERY | Facility: AMBULATORY SURGERY CENTER | Age: 29
End: 2024-02-02
Payer: COMMERCIAL

## 2024-02-02 VITALS
BODY MASS INDEX: 36.39 KG/M2 | SYSTOLIC BLOOD PRESSURE: 120 MMHG | OXYGEN SATURATION: 100 % | DIASTOLIC BLOOD PRESSURE: 75 MMHG | TEMPERATURE: 98.3 F | HEART RATE: 80 BPM | RESPIRATION RATE: 14 BRPM | WEIGHT: 199 LBS

## 2024-02-02 DIAGNOSIS — Z98.890 S/P LAPAROSCOPIC PROCEDURE: Primary | ICD-10-CM

## 2024-02-02 PROBLEM — R10.2 PELVIC PAIN: Status: ACTIVE | Noted: 2023-09-26

## 2024-02-02 LAB
HCG UR QL: NEGATIVE
INTERNAL QC OK POCT: NORMAL
POCT KIT EXPIRATION DATE: NORMAL
POCT KIT LOT NUMBER: NORMAL

## 2024-02-02 PROCEDURE — 49320 DIAG LAPARO SEPARATE PROC: CPT | Mod: GC | Performed by: STUDENT IN AN ORGANIZED HEALTH CARE EDUCATION/TRAINING PROGRAM

## 2024-02-02 PROCEDURE — 81025 URINE PREGNANCY TEST: CPT | Performed by: PATHOLOGY

## 2024-02-02 PROCEDURE — G0124 SCREEN C/V THIN LAYER BY MD: HCPCS | Performed by: PATHOLOGY

## 2024-02-02 PROCEDURE — G0145 SCR C/V CYTO,THINLAYER,RESCR: HCPCS | Performed by: STUDENT IN AN ORGANIZED HEALTH CARE EDUCATION/TRAINING PROGRAM

## 2024-02-02 PROCEDURE — 49320 DIAG LAPARO SEPARATE PROC: CPT

## 2024-02-02 RX ORDER — PROPOFOL 10 MG/ML
INJECTION, EMULSION INTRAVENOUS PRN
Status: DISCONTINUED | OUTPATIENT
Start: 2024-02-02 | End: 2024-02-02

## 2024-02-02 RX ORDER — NALOXONE HYDROCHLORIDE 0.4 MG/ML
0.2 INJECTION, SOLUTION INTRAMUSCULAR; INTRAVENOUS; SUBCUTANEOUS
Status: DISCONTINUED | OUTPATIENT
Start: 2024-02-02 | End: 2024-02-02 | Stop reason: HOSPADM

## 2024-02-02 RX ORDER — ONDANSETRON 2 MG/ML
INJECTION INTRAMUSCULAR; INTRAVENOUS PRN
Status: DISCONTINUED | OUTPATIENT
Start: 2024-02-02 | End: 2024-02-02

## 2024-02-02 RX ORDER — SODIUM CHLORIDE, SODIUM LACTATE, POTASSIUM CHLORIDE, CALCIUM CHLORIDE 600; 310; 30; 20 MG/100ML; MG/100ML; MG/100ML; MG/100ML
INJECTION, SOLUTION INTRAVENOUS CONTINUOUS
Status: DISCONTINUED | OUTPATIENT
Start: 2024-02-02 | End: 2024-02-02 | Stop reason: HOSPADM

## 2024-02-02 RX ORDER — LIDOCAINE HYDROCHLORIDE 20 MG/ML
INJECTION, SOLUTION INFILTRATION; PERINEURAL PRN
Status: DISCONTINUED | OUTPATIENT
Start: 2024-02-02 | End: 2024-02-02

## 2024-02-02 RX ORDER — KETOROLAC TROMETHAMINE 30 MG/ML
INJECTION, SOLUTION INTRAMUSCULAR; INTRAVENOUS PRN
Status: DISCONTINUED | OUTPATIENT
Start: 2024-02-02 | End: 2024-02-02

## 2024-02-02 RX ORDER — NALOXONE HYDROCHLORIDE 0.4 MG/ML
0.4 INJECTION, SOLUTION INTRAMUSCULAR; INTRAVENOUS; SUBCUTANEOUS
Status: DISCONTINUED | OUTPATIENT
Start: 2024-02-02 | End: 2024-02-02 | Stop reason: HOSPADM

## 2024-02-02 RX ORDER — FENTANYL CITRATE 50 UG/ML
50 INJECTION, SOLUTION INTRAMUSCULAR; INTRAVENOUS EVERY 5 MIN PRN
Status: DISCONTINUED | OUTPATIENT
Start: 2024-02-02 | End: 2024-02-03 | Stop reason: HOSPADM

## 2024-02-02 RX ORDER — ACETAMINOPHEN 325 MG/1
650 TABLET ORAL EVERY 6 HOURS PRN
COMMUNITY
Start: 2024-02-02 | End: 2024-02-16

## 2024-02-02 RX ORDER — BUPIVACAINE HYDROCHLORIDE 2.5 MG/ML
INJECTION, SOLUTION EPIDURAL; INFILTRATION; INTRACAUDAL PRN
Status: DISCONTINUED | OUTPATIENT
Start: 2024-02-02 | End: 2024-02-02 | Stop reason: HOSPADM

## 2024-02-02 RX ORDER — HYDROMORPHONE HYDROCHLORIDE 1 MG/ML
0.4 INJECTION, SOLUTION INTRAMUSCULAR; INTRAVENOUS; SUBCUTANEOUS EVERY 5 MIN PRN
Status: DISCONTINUED | OUTPATIENT
Start: 2024-02-02 | End: 2024-02-03 | Stop reason: HOSPADM

## 2024-02-02 RX ORDER — ACETAMINOPHEN 325 MG/1
975 TABLET ORAL ONCE
Status: DISCONTINUED | OUTPATIENT
Start: 2024-02-02 | End: 2024-02-03 | Stop reason: HOSPADM

## 2024-02-02 RX ORDER — OXYCODONE HYDROCHLORIDE 5 MG/1
5-10 TABLET ORAL EVERY 4 HOURS PRN
Qty: 6 TABLET | Refills: 0 | Status: SHIPPED | OUTPATIENT
Start: 2024-02-02 | End: 2024-02-16

## 2024-02-02 RX ORDER — ONDANSETRON 4 MG/1
4 TABLET, ORALLY DISINTEGRATING ORAL EVERY 8 HOURS PRN
Qty: 4 TABLET | Refills: 0 | Status: SHIPPED | OUTPATIENT
Start: 2024-02-02 | End: 2024-02-16

## 2024-02-02 RX ORDER — LIDOCAINE 40 MG/G
CREAM TOPICAL
Status: DISCONTINUED | OUTPATIENT
Start: 2024-02-02 | End: 2024-02-02 | Stop reason: HOSPADM

## 2024-02-02 RX ORDER — SODIUM CHLORIDE, SODIUM LACTATE, POTASSIUM CHLORIDE, CALCIUM CHLORIDE 600; 310; 30; 20 MG/100ML; MG/100ML; MG/100ML; MG/100ML
INJECTION, SOLUTION INTRAVENOUS CONTINUOUS
Status: DISCONTINUED | OUTPATIENT
Start: 2024-02-02 | End: 2024-02-03 | Stop reason: HOSPADM

## 2024-02-02 RX ORDER — FENTANYL CITRATE 50 UG/ML
25-50 INJECTION, SOLUTION INTRAMUSCULAR; INTRAVENOUS
Status: DISCONTINUED | OUTPATIENT
Start: 2024-02-02 | End: 2024-02-02 | Stop reason: HOSPADM

## 2024-02-02 RX ORDER — ACETAMINOPHEN 325 MG/1
975 TABLET ORAL ONCE
Status: COMPLETED | OUTPATIENT
Start: 2024-02-02 | End: 2024-02-02

## 2024-02-02 RX ORDER — FENTANYL CITRATE 50 UG/ML
25 INJECTION, SOLUTION INTRAMUSCULAR; INTRAVENOUS EVERY 5 MIN PRN
Status: DISCONTINUED | OUTPATIENT
Start: 2024-02-02 | End: 2024-02-03 | Stop reason: HOSPADM

## 2024-02-02 RX ORDER — SODIUM CHLORIDE, SODIUM LACTATE, POTASSIUM CHLORIDE, CALCIUM CHLORIDE 600; 310; 30; 20 MG/100ML; MG/100ML; MG/100ML; MG/100ML
INJECTION, SOLUTION INTRAVENOUS CONTINUOUS PRN
Status: DISCONTINUED | OUTPATIENT
Start: 2024-02-02 | End: 2024-02-02

## 2024-02-02 RX ORDER — FENTANYL CITRATE 50 UG/ML
INJECTION, SOLUTION INTRAMUSCULAR; INTRAVENOUS PRN
Status: DISCONTINUED | OUTPATIENT
Start: 2024-02-02 | End: 2024-02-02

## 2024-02-02 RX ORDER — ONDANSETRON 4 MG/1
4 TABLET, ORALLY DISINTEGRATING ORAL EVERY 30 MIN PRN
Status: DISCONTINUED | OUTPATIENT
Start: 2024-02-02 | End: 2024-02-03 | Stop reason: HOSPADM

## 2024-02-02 RX ORDER — ONDANSETRON 2 MG/ML
4 INJECTION INTRAMUSCULAR; INTRAVENOUS EVERY 30 MIN PRN
Status: DISCONTINUED | OUTPATIENT
Start: 2024-02-02 | End: 2024-02-03 | Stop reason: HOSPADM

## 2024-02-02 RX ORDER — LABETALOL HYDROCHLORIDE 5 MG/ML
10 INJECTION, SOLUTION INTRAVENOUS
Status: DISCONTINUED | OUTPATIENT
Start: 2024-02-02 | End: 2024-02-03 | Stop reason: HOSPADM

## 2024-02-02 RX ORDER — IBUPROFEN 800 MG/1
800 TABLET, FILM COATED ORAL EVERY 6 HOURS PRN
COMMUNITY
Start: 2024-02-02 | End: 2024-03-01

## 2024-02-02 RX ORDER — IBUPROFEN 200 MG
800 TABLET ORAL ONCE
Status: DISCONTINUED | OUTPATIENT
Start: 2024-02-02 | End: 2024-02-03 | Stop reason: HOSPADM

## 2024-02-02 RX ORDER — FLUMAZENIL 0.1 MG/ML
0.2 INJECTION, SOLUTION INTRAVENOUS
Status: DISCONTINUED | OUTPATIENT
Start: 2024-02-02 | End: 2024-02-02 | Stop reason: HOSPADM

## 2024-02-02 RX ORDER — OXYCODONE HYDROCHLORIDE 5 MG/1
5 TABLET ORAL
Status: DISCONTINUED | OUTPATIENT
Start: 2024-02-02 | End: 2024-02-03 | Stop reason: HOSPADM

## 2024-02-02 RX ORDER — HYDRALAZINE HYDROCHLORIDE 20 MG/ML
2.5-5 INJECTION INTRAMUSCULAR; INTRAVENOUS EVERY 10 MIN PRN
Status: DISCONTINUED | OUTPATIENT
Start: 2024-02-02 | End: 2024-02-03 | Stop reason: HOSPADM

## 2024-02-02 RX ORDER — AMOXICILLIN 250 MG
1-2 CAPSULE ORAL 2 TIMES DAILY
Qty: 30 TABLET | Refills: 0 | Status: SHIPPED | OUTPATIENT
Start: 2024-02-02 | End: 2024-02-16

## 2024-02-02 RX ORDER — PROPOFOL 10 MG/ML
INJECTION, EMULSION INTRAVENOUS CONTINUOUS PRN
Status: DISCONTINUED | OUTPATIENT
Start: 2024-02-02 | End: 2024-02-02

## 2024-02-02 RX ORDER — GLYCOPYRROLATE 0.2 MG/ML
INJECTION, SOLUTION INTRAMUSCULAR; INTRAVENOUS PRN
Status: DISCONTINUED | OUTPATIENT
Start: 2024-02-02 | End: 2024-02-02

## 2024-02-02 RX ORDER — HYDROMORPHONE HYDROCHLORIDE 1 MG/ML
0.2 INJECTION, SOLUTION INTRAMUSCULAR; INTRAVENOUS; SUBCUTANEOUS EVERY 5 MIN PRN
Status: DISCONTINUED | OUTPATIENT
Start: 2024-02-02 | End: 2024-02-03 | Stop reason: HOSPADM

## 2024-02-02 RX ORDER — DEXAMETHASONE SODIUM PHOSPHATE 4 MG/ML
INJECTION, SOLUTION INTRA-ARTICULAR; INTRALESIONAL; INTRAMUSCULAR; INTRAVENOUS; SOFT TISSUE PRN
Status: DISCONTINUED | OUTPATIENT
Start: 2024-02-02 | End: 2024-02-02

## 2024-02-02 RX ADMIN — Medication 50 MG: at 08:38

## 2024-02-02 RX ADMIN — PROPOFOL 200 MCG/KG/MIN: 10 INJECTION, EMULSION INTRAVENOUS at 08:39

## 2024-02-02 RX ADMIN — PROPOFOL 200 MG: 10 INJECTION, EMULSION INTRAVENOUS at 08:37

## 2024-02-02 RX ADMIN — GLYCOPYRROLATE 0.2 MG: 0.2 INJECTION, SOLUTION INTRAMUSCULAR; INTRAVENOUS at 09:07

## 2024-02-02 RX ADMIN — SODIUM CHLORIDE, SODIUM LACTATE, POTASSIUM CHLORIDE, CALCIUM CHLORIDE: 600; 310; 30; 20 INJECTION, SOLUTION INTRAVENOUS at 08:28

## 2024-02-02 RX ADMIN — LIDOCAINE HYDROCHLORIDE 100 MG: 20 INJECTION, SOLUTION INFILTRATION; PERINEURAL at 08:36

## 2024-02-02 RX ADMIN — ONDANSETRON 4 MG: 2 INJECTION INTRAMUSCULAR; INTRAVENOUS at 09:22

## 2024-02-02 RX ADMIN — SODIUM CHLORIDE, SODIUM LACTATE, POTASSIUM CHLORIDE, CALCIUM CHLORIDE: 600; 310; 30; 20 INJECTION, SOLUTION INTRAVENOUS at 06:43

## 2024-02-02 RX ADMIN — DEXAMETHASONE SODIUM PHOSPHATE 4 MG: 4 INJECTION, SOLUTION INTRA-ARTICULAR; INTRALESIONAL; INTRAMUSCULAR; INTRAVENOUS; SOFT TISSUE at 08:44

## 2024-02-02 RX ADMIN — FENTANYL CITRATE 50 MCG: 50 INJECTION, SOLUTION INTRAMUSCULAR; INTRAVENOUS at 08:36

## 2024-02-02 RX ADMIN — ACETAMINOPHEN 975 MG: 325 TABLET ORAL at 06:35

## 2024-02-02 RX ADMIN — PROPOFOL 175 MCG/KG/MIN: 10 INJECTION, EMULSION INTRAVENOUS at 09:05

## 2024-02-02 RX ADMIN — KETOROLAC TROMETHAMINE 15 MG: 30 INJECTION, SOLUTION INTRAMUSCULAR; INTRAVENOUS at 09:22

## 2024-02-02 NOTE — ANESTHESIA PROCEDURE NOTES
Airway       Patient location during procedure: OR       Procedure Start/Stop Times: 2/2/2024 8:39 AM  Staff -        Anesthesiologist:  Baldemar Moctezuma MD       CRNA: Coral Padilla APRN CRNA       Performed By: CRNAIndications and Patient Condition       Indications for airway management: clemente-procedural       Induction type:intravenous       Mask difficulty assessment: 1 - vent by mask    Final Airway Details       Final airway type: endotracheal airway       Successful airway: ETT - single and Oral  Endotracheal Airway Details        ETT size (mm): 6.5       Cuffed: yes       Successful intubation technique: direct laryngoscopy       DL Blade Type: MAC 4       Grade View of Cords: 1       Adjucts: stylet       Position: Right       Measured from: lips       Secured at (cm): 21       Bite block used: Oral Airway (at end of case)    Post intubation assessment        Placement verified by: capnometry, equal breath sounds and chest rise        Number of attempts at approach: 1       Ease of procedure: easy       Dentition: Intact    Medication(s) Administered   Medication Administration Time: 2/2/2024 8:39 AM

## 2024-02-02 NOTE — OP NOTE
[facility]  Operative Note      Name: Fela Solorzano  MRN: [id number]  : 1995    Date of Service: 24     Pre-operative diagnosis:  - Pelvic Pain  - Possible Endometriosis  - Cervical cancer screening    Post-operative diagnosis:  - Same, now s/p below stated procedure    Procedure:   - Pap smear  - Diagnostic Laparoscopy    Surgeon: Soha Espinoza MD  Assistants: Yariel Harvey MD, PGY-3    Anesthesia: General    EBL: 5 mL  Urine: 300 mL  Fluids: 700 mL crystalloid    Specimens: Pap smear  Complications: None    Findings: On exam under anesthesia, normal external genitalia, normal vagina and nulliparous cervix with IUD strings visible though short. Mobile uterus, no adnexal masses or fullness. No nodularity on uterosacral ligaments.Per laparoscopy, retroverted uterus noted with normal appearing bilateral fallopian tubes and ovaries. No abdominal adhesions noted. Possible clear endometriosis lesions on left hemidiaphragm and uterine fundus, unable to be biopsied. No endometrial like lesions noted in the anterior culdusac, posterior culdusac, right and left ovarian fossa. No adhesions from bowel to pelvic side wall. Laparoscopic abdominal survey revealed normal appearing omentum, liver, and bowel. Ureters identified bilaterally with vermiculation. Hemostasis noted.    Indications: Fela Solorzano is a 28 year old year old  female who presented to clinic for consultation given suspicion for endometriosis desiring diagnostic laparoscopy. Patient has along history of pelvic pain and has a Mirena IUD in place. After discussion of medical management options the patient would like to proceed with diagnostic laparoscopy. Discussed risks, benefits, and alternatives to the procedure including risk of infection, bleeding, damage to local organs, and increased pain and recovery time in the postoperative period. The patient's questions were answered, understanding confirmed,  and the patient signed written informed consent.    Technique: The patient was taken to the operating room where General Anesthesia was administered and found to adequate. A brief time out was performed. A medium graves speculum was placed and a Pap smear was collected in the usual fashion.  She was prepped and draped in usual sterile manner in dorsal lithotomy position. A time out was performed. SCDs were in place for VTE prophylaxis.    A speculum was inserted into the vagina. A sponge stick was placed in the vagina to manipulate the uterus.  Attention was then turned to the abdomen.  The umbilicus was then incised with a scalpel and a Veress needle was inserted into the abdomen. Intraabdominal placement was verified with a saline drop test. The abdomen was insufflated to a pressure of 13 mmHg. Opening pressure was 7 mmHg. A 5 mm trocar was then inserted into the umbilicus and a 5 mm laparoscope was introduced. Abdominal survey revealed atraumatic entry and the findings above. An additional 5 mm port was placed in the left lower quadrant under direct visualization. The patient was placed in Trendelenburg. The bowel was swept out of the pelvis. The findings above were found. The abdomen was desufflated and the ports were removed under direct visualization. The skin incisions were closed with 4-0 Vicryl. Sterile bandages were then applied. All instruments removed from the vagina and cervix.    Instrument, needle, and sponge counts were correct times two. Dr. Espinoza was present and scrubbed for the entire case. The patient was transferred to the PACU in stable condition.    Yariel Harvey MD, MPH  Ob/Gyn Resident, PGY-3  02/02/24 7:49 AM    I was present and scrubbed for the entire case.  Soha Espinoza MD

## 2024-02-02 NOTE — ANESTHESIA CARE TRANSFER NOTE
Patient: Fela Solorzano    Procedure: Procedure(s):  Diagnostic laparoscopy, pap smear, possible excision of endometriosis       Diagnosis: Pelvic pain [R10.2]  Diagnosis Additional Information: No value filed.    Anesthesia Type:   General     Note:    Oropharynx: spontaneously breathing  Level of Consciousness: awake  Oxygen Supplementation: face mask  Level of Supplemental Oxygen (L/min / FiO2): 6  Independent Airway: airway patency satisfactory and stable  Dentition: dentition unchanged  Vital Signs Stable: post-procedure vital signs reviewed and stable  Report to RN Given: handoff report given  Patient transferred to: PACU    Handoff Report: Identifed the Patient, Identified the Reponsible Provider, Reviewed the pertinent medical history, Discussed the surgical course, Reviewed Intra-OP anesthesia mangement and issues during anesthesia, Set expectations for post-procedure period and Allowed opportunity for questions and acknowledgement of understanding      Vitals:  Vitals Value Taken Time   /79 02/02/24 0938   Temp 97.2    Pulse 93 02/02/24 0941   Resp 25 02/02/24 0941   SpO2 99 % 02/02/24 0941   Vitals shown include unfiled device data.    Electronically Signed By: RASHEEDA Parisi CRNA  February 2, 2024  9:42 AM

## 2024-02-02 NOTE — TELEPHONE ENCOUNTER
Called patient to review operative findings. No adhesions or endometriosis. Small physiologic-appearing cyst on right ovary. Possible clear endometriosis lesions on left hemidiaphragm and uterine fundus, unable to biopsy these sites. Discussed seeing how recovery goes and possibility of adding or changing medication as needed at time of postop visit.    Soha Espinoza MD,  02/02/24 10:45 AM

## 2024-02-02 NOTE — DISCHARGE INSTRUCTIONS
[facility]  Home Care Following Anesthesia  For 24 hours after surgery:  Get plenty of rest.  A responsible adult must stay with you for at least 24 hours after you leave the surgery center.  Do not drive or use heavy equipment.  If you have weakness or tingling, don't drive or use heavy equipment until this feeling goes away.   Do not drink alcohol.   Avoid strenuous or risky activities.  Ask for help when climbing stairs.  You may feel lightheaded.  IF so, sit for a few minutes before standing.  Have someone help you get up.   If you have nausea (feel sick to your stomach): Drink only clear liquids such as apple juice, ginger ale, broth or 7-Up.  Rest may also help.  Be sure to drink enough fluids.  Move to a regular diet as you feel able.   You may have a slight fever.  Call the doctor if your fever is over 100 F (37.7 C) (taken under the tongue) or lasts longer than 24 hours.  You may have a dry mouth, a sore throat, muscle aches or trouble sleeping. These should go away after 24 hours.  Do not make important or legal decisions.   It is recommended to avoid smoking.   If you use hormonal birth control (such as the pill, patch, ring or implants):  You will need a second form of birth control for 7 days (condoms, a diaphragm or contraceptive foam).  While in the surgery center, you received a medicine called Sugammadex.  Hormonal birth control (such as the pill, patch, ring or implants) will not work as well for a week after taking this medicine.         Tips for taking pain medications  To get the best pain relief possible, remember these points:  Take pain medications as directed, before pain becomes severe.  Pain medication can upset your stomach: taking it with food may help.  Constipation is a common side effect of pain medication. Drink plenty of  fluids.  Eat foods high in fiber. Take a stool softener if recommended by your doctor or pharmacist.  Do not drink alcohol,  drive or operate machinery while taking pain medications.  Ask about other ways to control pain, such as with heat, ice or relaxation.    Tylenol/Acetaminophen Consumption    If you feel your pain relief is insufficient, you may take Tylenol/Acetaminophen in addition to your narcotic pain medication.   Be careful not to exceed 4,000 mg of Tylenol/Acetaminophen in a 24 hour period from all sources.  If you are taking extra strength Tylenol/acetaminophen (500 mg), the maximum dose is 8 tablets in 24 hours.  If you are taking regular strength acetaminophen (325 mg), the maximum dose is 12 tablets in 24 hours.    Call a doctor for any of the following:  Signs of infection (fever, growing tenderness at the surgery site, a large amount of drainage or bleeding, severe pain, foul-smelling drainage, redness, swelling).  It has been over 8 to 10 hours since surgery and you are still not able to urinate (pass water).  Headache for over 24 hours.  Numbness, tingling or weakness the day after surgery (if you had spinal anesthesia).  Signs of Covid-19 infection (temperature over 100 degrees, shortness of breath, cough, loss of taste/smell, generalized body aches, persistent headache, chills, sore throat, nausea/vomiting/diarrhea)  Your doctor is:       Dr. Soha Espinoza, OB/GYN: [phone]               Or dial [phone] and ask for the resident on call for:  OB/GYN  For emergency care, call the:  [facility] Emergency Department: [phone] (TTY for hearing impaired: [phone])

## 2024-02-06 ENCOUNTER — TELEPHONE (OUTPATIENT)
Dept: OBGYN | Facility: CLINIC | Age: 29
End: 2024-02-06
Payer: COMMERCIAL

## 2024-02-06 LAB
BKR LAB AP GYN ADEQUACY: ABNORMAL
BKR LAB AP GYN INTERPRETATION: ABNORMAL
PATH REPORT.COMMENTS IMP SPEC: ABNORMAL
PATH REPORT.COMMENTS IMP SPEC: ABNORMAL
PATH REPORT.RELEVANT HX SPEC: ABNORMAL

## 2024-02-06 NOTE — TELEPHONE ENCOUNTER
[facility] Call Center    Phone Message    May a detailed message be left on voicemail: yes     Reason for Call: Other: Patient would like to speak with Dr. Espinoza re different procedures per provider request.  Patient states she is available Wednesday all day, Thursday all day and Friday morning. Please reach out to patient.     Action Taken: Other: [facility]    Travel Screening: Not Applicable

## 2024-02-08 ENCOUNTER — PREP FOR PROCEDURE (OUTPATIENT)
Dept: OBGYN | Facility: CLINIC | Age: 29
End: 2024-02-08
Payer: COMMERCIAL

## 2024-02-08 DIAGNOSIS — R87.613 HSIL (HIGH GRADE SQUAMOUS INTRAEPITHELIAL LESION) ON PAP SMEAR OF CERVIX: Primary | ICD-10-CM

## 2024-02-08 RX ORDER — ACETAMINOPHEN 325 MG/1
975 TABLET ORAL ONCE
Status: CANCELLED | OUTPATIENT
Start: 2024-02-08 | End: 2024-02-08

## 2024-02-08 NOTE — ANESTHESIA POSTPROCEDURE EVALUATION
Patient: Fela Solorzano    Procedure: Procedure(s):  Diagnostic laparoscopy, pap smear, possible excision of endometriosis       Anesthesia Type:  General    Note:  Disposition: Outpatient   Postop Pain Control: Uneventful            Sign Out: Well controlled pain   PONV: No   Neuro/Psych: Uneventful            Sign Out: Acceptable/Baseline neuro status   Airway/Respiratory: Uneventful            Sign Out: Acceptable/Baseline resp. status   CV/Hemodynamics: Uneventful            Sign Out: Acceptable CV status; No obvious hypovolemia; No obvious fluid overload   Other NRE: NONE   DID A NON-ROUTINE EVENT OCCUR?            Last vitals:  Vitals Value Taken Time   /78 02/02/24 0955   Temp 36.4  C (97.5  F) 02/02/24 0955   Pulse 86 02/02/24 0955   Resp 16 02/02/24 0955   SpO2 100 % 02/02/24 0955       Electronically Signed By: Baldemar Moctezuma MD  February 2, 2024  12:56 PM   no

## 2024-02-08 NOTE — TELEPHONE ENCOUNTER
Called patient to discuss pap results. Also had dx lsc on 2/2. Feeling like recovery is going well.     Discussed HSIL pap results and recommendation for colposcopy vs moving directly to excisional treatment with LEEP. Discussed risks/benefits of both options. Fela prefers to move forward with LEEP. Discussed in clinic vs OR LEEP, she prefers OR. Will place orders.

## 2024-02-15 ENCOUNTER — ANESTHESIA EVENT (OUTPATIENT)
Dept: SURGERY | Facility: AMBULATORY SURGERY CENTER | Age: 29
End: 2024-02-15
Payer: COMMERCIAL

## 2024-02-15 NOTE — ANESTHESIA PREPROCEDURE EVALUATION
Anesthesia Pre-Procedure Evaluation    Patient: Fela Solorzano   MRN: [id number] : 1995        Procedure : Procedure(s):  CONE BIOPSY, CERVIX, USING LOOP ELECTROSURGICAL EXCISION PROCEDURE (LEEP)          Past Medical History:   Diagnosis Date    Acne     on minocycline 10/2012-2014    Allergic rhinitis     Depressive disorder     Headache(784.0)     tension-type by description    Scoliosis     TLSO bracing for 3 years      Past Surgical History:   Procedure Laterality Date    LAPAROSCOPY DIAGNOSTIC (GYN) N/A 2024    Procedure: Diagnostic laparoscopy, pap smear, possible excision of endometriosis;  Surgeon: Soha Espinoza MD;  Location: [facility] OR      Allergies   Allergen Reactions    Minocycline Other (See Comments) and Muscle Pain (Myalgia)     Other reaction(s): Arthralgia  HUT Reaction: Arthralgia    Other reaction(s): Arthralgia        Social History     Tobacco Use    Smoking status: Never    Smokeless tobacco: Never   Substance Use Topics    Alcohol use: Yes     Comment: 1-2 times a month      Wt Readings from Last 1 Encounters:   24 90.3 kg (199 lb)        Anesthesia Evaluation            ROS/MED HX  ENT/Pulmonary:  - neg pulmonary ROS     Neurologic:     (+)      migraines,                          Cardiovascular:  - neg cardiovascular ROS  (-) murmur   METS/Exercise Tolerance: >4 METS    Hematologic:  - neg hematologic  ROS     Musculoskeletal: Comment: Scoliosis - neg musculoskeletal ROS     GI/Hepatic:     (+) GERD,      Inflammatory bowel disease,             Renal/Genitourinary:  - neg Renal ROS     Endo:     (+)               Obesity,       Psychiatric/Substance Use:     (+) psychiatric history anxiety and depression       Infectious Disease: Comment: Chest abscess - on antibiotics      Malignancy:       Other:            Physical Exam    Airway        Mallampati: I   TM distance: > 3 FB   Neck ROM: full   Mouth opening: > 3 cm    Respiratory Devices and Support         Dental      Comment: Teeth examined without any significant abnormality, patient denies loose, missing or chipped teeth or anything removable.         Cardiovascular          Rhythm and rate: regular and normal (-) no murmur    Pulmonary   pulmonary exam normal        breath sounds clear to auscultation           OUTSIDE LABS:  CBC:   Lab Results   Component Value Date    WBC 3.9 (L) 06/14/2023    WBC 3.5 (A) 08/06/2015    HGB 12.3 06/14/2023    HGB 13.3 08/06/2015    HCT 36.4 06/14/2023    HCT 41.2 07/09/2015     06/14/2023     08/06/2015     BMP:   Lab Results   Component Value Date     06/14/2023    POTASSIUM 3.6 06/14/2023    CHLORIDE 104 06/14/2023    CO2 26 06/14/2023    BUN 14.5 06/14/2023    CR 0.82 06/14/2023    CR 0.84 07/09/2015    GLC 81 06/14/2023     COAGS:   Lab Results   Component Value Date    PTT 32 01/30/2014    INR 1.16 (H) 01/30/2014     POC:   Lab Results   Component Value Date    HCG Negative 02/02/2024     HEPATIC:   Lab Results   Component Value Date    ALBUMIN 4.7 06/14/2023    PROTTOTAL 7.5 06/14/2023    ALT 16 06/14/2023    AST 23 06/14/2023    ALKPHOS 69 06/14/2023    BILITOTAL 0.3 06/14/2023     OTHER:   Lab Results   Component Value Date    TOMMY 9.8 06/14/2023    TSH 4.09 06/14/2023    CRP 15.0 (H) 07/09/2015    SED 7 06/14/2023       Anesthesia Plan    ASA Status:  2    NPO Status:  NPO Appropriate    Anesthesia Type: MAC.     - Reason for MAC: immobility needed   Induction: Intravenous, Propofol.   Maintenance: TIVA.        Consents    Anesthesia Plan(s) and associated risks, benefits, and realistic alternatives discussed. Questions answered and patient/representative(s) expressed understanding.     - Discussed:     - Discussed with:  Patient      - Extended Intubation/Ventilatory Support Discussed: No.      - Patient is DNR/DNI Status: No     Use of blood products discussed: No .     Postoperative Care    Pain management: IV analgesics.   PONV prophylaxis: Ondansetron (or  "other 5HT-3), Background Propofol Infusion     Comments:    Other Comments: Discussed plan for IV anesthetic with native airway. Discussed potential need for conversion to general anesthetic with airway management and potential for recall.             Henry Espino MD    I have reviewed the pertinent notes and labs in the chart from the past 30 days and (re)examined the patient.  Any updates or changes from those notes are reflected in this note.              # Obesity: Estimated body mass index is 36.39 kg/m  as calculated from the following:    Height as of 1/31/24: 1.575 m (5' 2.01\").    Weight as of 2/2/24: 90.3 kg (199 lb).      "

## 2024-02-16 ENCOUNTER — ANESTHESIA (OUTPATIENT)
Dept: SURGERY | Facility: AMBULATORY SURGERY CENTER | Age: 29
End: 2024-02-16
Payer: COMMERCIAL

## 2024-02-16 ENCOUNTER — HOSPITAL ENCOUNTER (OUTPATIENT)
Facility: AMBULATORY SURGERY CENTER | Age: 29
Discharge: HOME | End: 2024-02-16
Attending: STUDENT IN AN ORGANIZED HEALTH CARE EDUCATION/TRAINING PROGRAM | Admitting: STUDENT IN AN ORGANIZED HEALTH CARE EDUCATION/TRAINING PROGRAM
Payer: COMMERCIAL

## 2024-02-16 VITALS
WEIGHT: 199 LBS | BODY MASS INDEX: 36.62 KG/M2 | TEMPERATURE: 97.5 F | RESPIRATION RATE: 16 BRPM | HEART RATE: 104 BPM | OXYGEN SATURATION: 100 % | SYSTOLIC BLOOD PRESSURE: 113 MMHG | HEIGHT: 62 IN | DIASTOLIC BLOOD PRESSURE: 79 MMHG

## 2024-02-16 PROCEDURE — 57522 CONIZATION OF CERVIX: CPT | Mod: GC | Performed by: STUDENT IN AN ORGANIZED HEALTH CARE EDUCATION/TRAINING PROGRAM

## 2024-02-16 PROCEDURE — 88307 TISSUE EXAM BY PATHOLOGIST: CPT | Mod: 26 | Performed by: STUDENT IN AN ORGANIZED HEALTH CARE EDUCATION/TRAINING PROGRAM

## 2024-02-16 PROCEDURE — 81025 URINE PREGNANCY TEST: CPT | Performed by: PATHOLOGY

## 2024-02-16 PROCEDURE — 57522 CONIZATION OF CERVIX: CPT | Performed by: ANESTHESIOLOGY

## 2024-02-16 PROCEDURE — 57522 CONIZATION OF CERVIX: CPT | Performed by: NURSE ANESTHETIST, CERTIFIED REGISTERED

## 2024-02-16 PROCEDURE — 88342 IMHCHEM/IMCYTCHM 1ST ANTB: CPT | Mod: 26 | Performed by: STUDENT IN AN ORGANIZED HEALTH CARE EDUCATION/TRAINING PROGRAM

## 2024-02-16 PROCEDURE — 88342 IMHCHEM/IMCYTCHM 1ST ANTB: CPT | Mod: TC | Performed by: STUDENT IN AN ORGANIZED HEALTH CARE EDUCATION/TRAINING PROGRAM

## 2024-02-16 PROCEDURE — 57522 CONIZATION OF CERVIX: CPT

## 2024-02-16 RX ORDER — ACETAMINOPHEN 325 MG/1
975 TABLET ORAL ONCE
Status: COMPLETED | OUTPATIENT
Start: 2024-02-16 | End: 2024-02-16

## 2024-02-16 RX ORDER — KETAMINE HYDROCHLORIDE 10 MG/ML
INJECTION INTRAMUSCULAR; INTRAVENOUS PRN
Status: DISCONTINUED | OUTPATIENT
Start: 2024-02-16 | End: 2024-02-16

## 2024-02-16 RX ORDER — PROPOFOL 10 MG/ML
INJECTION, EMULSION INTRAVENOUS CONTINUOUS PRN
Status: DISCONTINUED | OUTPATIENT
Start: 2024-02-16 | End: 2024-02-16

## 2024-02-16 RX ORDER — IBUPROFEN 200 MG
800 TABLET ORAL ONCE
Status: CANCELLED | OUTPATIENT
Start: 2024-02-16 | End: 2024-02-16

## 2024-02-16 RX ORDER — IODINE AND POTASSIUM IODIDE 50; 100 MG/ML; MG/ML
LIQUID ORAL PRN
Status: DISCONTINUED | OUTPATIENT
Start: 2024-02-16 | End: 2024-02-16 | Stop reason: HOSPADM

## 2024-02-16 RX ORDER — ACETAMINOPHEN 325 MG/1
975 TABLET ORAL ONCE
Status: CANCELLED | OUTPATIENT
Start: 2024-02-16 | End: 2024-02-16

## 2024-02-16 RX ORDER — OXYCODONE HYDROCHLORIDE 5 MG/1
10 TABLET ORAL
Status: DISCONTINUED | OUTPATIENT
Start: 2024-02-16 | End: 2024-02-17 | Stop reason: HOSPADM

## 2024-02-16 RX ORDER — OXYCODONE HYDROCHLORIDE 5 MG/1
5 TABLET ORAL
Status: COMPLETED | OUTPATIENT
Start: 2024-02-16 | End: 2024-02-16

## 2024-02-16 RX ORDER — ACETAMINOPHEN 325 MG/1
975 TABLET ORAL ONCE
Status: DISCONTINUED | OUTPATIENT
Start: 2024-02-16 | End: 2024-02-17 | Stop reason: HOSPADM

## 2024-02-16 RX ORDER — LIDOCAINE HYDROCHLORIDE 20 MG/ML
INJECTION, SOLUTION INFILTRATION; PERINEURAL PRN
Status: DISCONTINUED | OUTPATIENT
Start: 2024-02-16 | End: 2024-02-16

## 2024-02-16 RX ORDER — FERRIC SUBSULFATE 0.21 G/G
LIQUID TOPICAL PRN
Status: DISCONTINUED | OUTPATIENT
Start: 2024-02-16 | End: 2024-02-16 | Stop reason: HOSPADM

## 2024-02-16 RX ORDER — PROPOFOL 10 MG/ML
INJECTION, EMULSION INTRAVENOUS PRN
Status: DISCONTINUED | OUTPATIENT
Start: 2024-02-16 | End: 2024-02-16

## 2024-02-16 RX ORDER — ONDANSETRON 2 MG/ML
INJECTION INTRAMUSCULAR; INTRAVENOUS PRN
Status: DISCONTINUED | OUTPATIENT
Start: 2024-02-16 | End: 2024-02-16

## 2024-02-16 RX ORDER — GLYCOPYRROLATE 0.2 MG/ML
INJECTION, SOLUTION INTRAMUSCULAR; INTRAVENOUS PRN
Status: DISCONTINUED | OUTPATIENT
Start: 2024-02-16 | End: 2024-02-16

## 2024-02-16 RX ORDER — SODIUM CHLORIDE, SODIUM LACTATE, POTASSIUM CHLORIDE, CALCIUM CHLORIDE 600; 310; 30; 20 MG/100ML; MG/100ML; MG/100ML; MG/100ML
INJECTION, SOLUTION INTRAVENOUS CONTINUOUS PRN
Status: DISCONTINUED | OUTPATIENT
Start: 2024-02-16 | End: 2024-02-16

## 2024-02-16 RX ORDER — SODIUM CHLORIDE, SODIUM LACTATE, POTASSIUM CHLORIDE, CALCIUM CHLORIDE 600; 310; 30; 20 MG/100ML; MG/100ML; MG/100ML; MG/100ML
INJECTION, SOLUTION INTRAVENOUS CONTINUOUS
Status: DISCONTINUED | OUTPATIENT
Start: 2024-02-16 | End: 2024-02-17 | Stop reason: HOSPADM

## 2024-02-16 RX ORDER — ONDANSETRON 4 MG/1
4 TABLET, ORALLY DISINTEGRATING ORAL EVERY 30 MIN PRN
Status: DISCONTINUED | OUTPATIENT
Start: 2024-02-16 | End: 2024-02-17 | Stop reason: HOSPADM

## 2024-02-16 RX ORDER — LIDOCAINE 40 MG/G
CREAM TOPICAL
Status: DISCONTINUED | OUTPATIENT
Start: 2024-02-16 | End: 2024-02-17 | Stop reason: HOSPADM

## 2024-02-16 RX ORDER — ONDANSETRON 2 MG/ML
4 INJECTION INTRAMUSCULAR; INTRAVENOUS EVERY 30 MIN PRN
Status: DISCONTINUED | OUTPATIENT
Start: 2024-02-16 | End: 2024-02-17 | Stop reason: HOSPADM

## 2024-02-16 RX ORDER — KETOROLAC TROMETHAMINE 30 MG/ML
INJECTION, SOLUTION INTRAMUSCULAR; INTRAVENOUS PRN
Status: DISCONTINUED | OUTPATIENT
Start: 2024-02-16 | End: 2024-02-16

## 2024-02-16 RX ORDER — LIDOCAINE HYDROCHLORIDE AND EPINEPHRINE 10; 10 MG/ML; UG/ML
INJECTION, SOLUTION INFILTRATION; PERINEURAL PRN
Status: DISCONTINUED | OUTPATIENT
Start: 2024-02-16 | End: 2024-02-16 | Stop reason: HOSPADM

## 2024-02-16 RX ORDER — FENTANYL CITRATE 50 UG/ML
INJECTION, SOLUTION INTRAMUSCULAR; INTRAVENOUS PRN
Status: DISCONTINUED | OUTPATIENT
Start: 2024-02-16 | End: 2024-02-16

## 2024-02-16 RX ADMIN — FENTANYL CITRATE 25 MCG: 50 INJECTION, SOLUTION INTRAMUSCULAR; INTRAVENOUS at 09:20

## 2024-02-16 RX ADMIN — ONDANSETRON 4 MG: 2 INJECTION INTRAMUSCULAR; INTRAVENOUS at 09:20

## 2024-02-16 RX ADMIN — KETAMINE HYDROCHLORIDE 20 MG: 10 INJECTION INTRAMUSCULAR; INTRAVENOUS at 09:20

## 2024-02-16 RX ADMIN — OXYCODONE HYDROCHLORIDE 5 MG: 5 TABLET ORAL at 10:23

## 2024-02-16 RX ADMIN — KETOROLAC TROMETHAMINE 30 MG: 30 INJECTION, SOLUTION INTRAMUSCULAR; INTRAVENOUS at 09:54

## 2024-02-16 RX ADMIN — FENTANYL CITRATE 25 MCG: 50 INJECTION, SOLUTION INTRAMUSCULAR; INTRAVENOUS at 09:27

## 2024-02-16 RX ADMIN — GLYCOPYRROLATE 0.2 MG: 0.2 INJECTION, SOLUTION INTRAMUSCULAR; INTRAVENOUS at 09:16

## 2024-02-16 RX ADMIN — ACETAMINOPHEN 975 MG: 325 TABLET ORAL at 07:01

## 2024-02-16 RX ADMIN — PROPOFOL 60 MG: 10 INJECTION, EMULSION INTRAVENOUS at 09:20

## 2024-02-16 RX ADMIN — SODIUM CHLORIDE, SODIUM LACTATE, POTASSIUM CHLORIDE, CALCIUM CHLORIDE: 600; 310; 30; 20 INJECTION, SOLUTION INTRAVENOUS at 09:14

## 2024-02-16 RX ADMIN — PROPOFOL 150 MCG/KG/MIN: 10 INJECTION, EMULSION INTRAVENOUS at 09:20

## 2024-02-16 RX ADMIN — LIDOCAINE HYDROCHLORIDE 60 MG: 20 INJECTION, SOLUTION INFILTRATION; PERINEURAL at 09:20

## 2024-02-16 NOTE — LETTER
February 16, 2024      RE: Fela Solorzano  [address]  [place] [address]       To whom it may concern:    Fela Solorzano had surgery on 2/2/24 and 2/16/24. She is required to have light activity until 2/23/24 and no lifting more than 15 pounds until 3/1/24.     Sincerely,      Soha Espinoza MD,  02/16/24 7:47 AM

## 2024-02-16 NOTE — BRIEF OP NOTE
[facility]    Brief Operative Note    Pre-operative diagnosis: HSIL (high grade squamous intraepithelial lesion) on Pap smear of cervix [R87.613]  Post-operative diagnosis Same as pre-operative diagnosis    Procedure: CONE BIOPSY, CERVIX, USING LOOP ELECTROSURGICAL EXCISION PROCEDURE (LEEP), N/A - Cervix    Surgeon: Surgeon(s) and Role:     * Soha Espinoza MD - Primary     * Jad Lyons MD - Resident - Assisting  Anesthesia: MAC with Local   Estimated Blood Loss: 10mL      Drains: None  Specimens:   ID Type Source Tests Collected by Time Destination   1 : LEEP posterior cervix, tagged with dyed suture, LEEP anterior cervix, tagged with undyed suture stitch on endocervical margin Loop Electrosurgical Excision Procedure (LEEP) Cervix SURGICAL PATHOLOGY EXAM Soha Espinoza MD 2/16/2024  9:45 AM      Findings:  Normal appearing vulva and vagina. On colposcopy, cervix notable for aceto white changes, punctations and mosacism in a 1-2mm area proximal to cervical os at 12 o'clock. Decreased uptake of Lugol's at 12 o'clock immediately anterior to cervical os, in that same area. Appears to be JAVIER 2. Hemostasis achieved with cautery and monsel's.   Complications: None.  Implants: * No implants in log *    Jad Lyons MD  Obstetrics & Gynecology, PGY-1  02/16/2024 10:13 AM

## 2024-02-16 NOTE — OP NOTE
Gynecology Operative Note    Name: Fela Solorzano  MRN:[id number]  : 1995  Date of Surgery: 2024    Pre-operative Diagnosis: HSIL   Post-operative Diagnosis: Same  Procedure(s): Cervical Conization using Loop Electrosurgical excision procedure  Surgeon: Soha Espinoza MD   Assistants: Jad Lyons MD PGY1    Anesthesia: MAC  EBL: 10 mL   Urine Output: patient voided prior to procedure   Fluids: 500 mL crystalloid    Specimens: Anterior and posterior cervix  Complications: None apparent.  Findings: EUA revealed normal appearing genital architecture without grossly visible lesions. On colposcopy, SCJ was seen entirely and the exam satisfactory. Cervix notable for aceto white changes, punctations and mosacism in a 1-2mm area proximal to cervical os at 12 o'clock. Decreased uptake of Lugol's at from 11 o'clock to 1 o'clock immediately anterior to cervical os, in that same area. Appears to be JAVIER 2.      Indications: Fela Solorzaon is a 28 year old female who was found to have HSIL, so she was counseled on her management options including colposcopy and LEEP. She opted for more definitive management with a LEEP under anesthesia.  Discussed risks, benefits, and alternatives to the procedure including risk of infection, bleeding, recurrence. The patient's questions were answered, understanding confirmed, and the patient signed written informed consent.    Technique: The patient was taken to the OR where she was placed in the dorsal lithotomy position with feet in yellow fin leg holders. MAC anesthesia was administered and found to be adequate. EUA was performed with findings as above. A speculum was placed in the vagina and the cervix visualized. Dilute acetic acid was applied to the cervix and colposcopy performed with above mentioned findings. Lugol's solution was applied to the vagina and cervix, and uptake was noted in the vaginal mucosa. The anterior cervix, proximal to the os at 12 o'clock  demonstrated absent uptake. A total of 20 mL of 1% lidocaine with epi was used for an intra cervical block and paracervical block. A single toothed tenaculum was placed on the anterior lip of the cervix. A 10mm x 10 mm  loop electrode on blend was used to remove a small portions of posterior and anterior cervix. A ball electrode was used on coagulation electrocautery to achieve good hemostasis of the cervix. The temaculum was removed. Monsels was applied to cervix. The speculum removed and patient cleaned.    Dr. Espinoza was present for the entire procedure. The patient tolerated the procedure well and was transferred to the PACU in stable condition.    Jad Lyons MD  Obstetrics & Gynecology, PGY-1  02/16/2024     I was present and scrubbed for the entire procedure and agree with the above note with edits made by me where appropriate.  Soha Espinoza MD

## 2024-02-16 NOTE — DISCHARGE INSTRUCTIONS
[facility]  Home Care Following Anesthesia  For 24 hours after surgery:  Get plenty of rest.  A responsible adult must stay with you for at least 24 hours after you leave the surgery center.  Do not drive or use heavy equipment.  If you have weakness or tingling, don't drive or use heavy equipment until this feeling goes away.   Do not drink alcohol.   Avoid strenuous or risky activities.  Ask for help when climbing stairs.  You may feel lightheaded.  IF so, sit for a few minutes before standing.  Have someone help you get up.   If you have nausea (feel sick to your stomach): Drink only clear liquids such as apple juice, ginger ale, broth or 7-Up.  Rest may also help.  Be sure to drink enough fluids.  Move to a regular diet as you feel able.   You may have a slight fever.  Call the doctor if your fever is over 100 F (37.7 C) (taken under the tongue) or lasts longer than 24 hours.  You may have a dry mouth, a sore throat, muscle aches or trouble sleeping. These should go away after 24 hours.  Do not make important or legal decisions.   It is recommended to avoid smoking.               Tips for taking pain medications  To get the best pain relief possible, remember these points:  Take pain medications as directed, before pain becomes severe.  Pain medication can upset your stomach: taking it with food may help.  Constipation is a common side effect of pain medication. Drink plenty of  fluids.  Eat foods high in fiber. Take a stool softener if recommended by your doctor or pharmacist.  Do not drink alcohol, drive or operate machinery while taking pain medications.  Ask about other ways to control pain, such as with heat, ice or relaxation.    Tylenol/Acetaminophen Consumption    If you feel your pain relief is insufficient, you may take Tylenol/Acetaminophen in addition to your narcotic pain medication.   Be careful not to exceed 4,000 mg of Tylenol/Acetaminophen in a 24 hour  period from all sources.  If you are taking extra strength Tylenol/acetaminophen (500 mg), the maximum dose is 8 tablets in 24 hours.  If you are taking regular strength acetaminophen (325 mg), the maximum dose is 12 tablets in 24 hours.  Tylenol was given at 7AM today; next dose ok after 1PM today (2/16/24).      Call a doctor for any of the following:  Signs of infection (fever, growing tenderness at the surgery site, a large amount of drainage or bleeding, severe pain, foul-smelling drainage, redness, swelling).  It has been over 8 to 10 hours since surgery and you are still not able to urinate (pass water).  Headache for over 24 hours.  Numbness, tingling or weakness the day after surgery (if you had spinal anesthesia).  Signs of Covid-19 infection (temperature over 100 degrees, shortness of breath, cough, loss of taste/smell, generalized body aches, persistent headache, chills, sore throat, nausea/vomiting/diarrhea)  Your doctor is:       Dr. Soha Espinoza, OB/GYN: [phone]               Or dial [phone] and ask for the resident on call for:  OB/GYN  For emergency care, call the:  [facility] Emergency Department: [phone] (TTY for hearing impaired: [phone])

## 2024-02-16 NOTE — ANESTHESIA POSTPROCEDURE EVALUATION
Patient: Fela Solorzano    Procedure: Procedure(s):  CONE BIOPSY, CERVIX, USING LOOP ELECTROSURGICAL EXCISION PROCEDURE (LEEP)       Anesthesia Type:  MAC    Note:  Disposition: Outpatient   Postop Pain Control: Uneventful            Sign Out: Well controlled pain   PONV: No   Neuro/Psych: Uneventful            Sign Out: Acceptable/Baseline neuro status   Airway/Respiratory: Uneventful            Sign Out: Acceptable/Baseline resp. status   CV/Hemodynamics: Uneventful            Sign Out: Acceptable CV status; No obvious hypovolemia; No obvious fluid overload   Other NRE:    DID A NON-ROUTINE EVENT OCCUR? No           Last vitals:  Vitals Value Taken Time   /79 02/16/24 1038   Temp 36.4  C (97.5  F) 02/16/24 1038   Pulse 104 02/16/24 1015   Resp 16 02/16/24 1038   SpO2 100 % 02/16/24 1038       Electronically Signed By: Henry Espino MD  February 16, 2024  1:21 PM

## 2024-02-16 NOTE — ANESTHESIA CARE TRANSFER NOTE
Patient: Fela Solorzano    Procedure: Procedure(s):  CONE BIOPSY, CERVIX, USING LOOP ELECTROSURGICAL EXCISION PROCEDURE (LEEP)       Diagnosis: HSIL (high grade squamous intraepithelial lesion) on Pap smear of cervix [R87.613]  Diagnosis Additional Information: No value filed.    Anesthesia Type:   MAC     Note:      Level of Consciousness: awake  Oxygen Supplementation: room air    Independent Airway: airway patency satisfactory and stable        Patient transferred to: PACU    Handoff Report: Identifed the Patient, Identified the Reponsible Provider, Reviewed the pertinent medical history, Discussed the surgical course, Reviewed Intra-OP anesthesia mangement and issues during anesthesia, Set expectations for post-procedure period and Allowed opportunity for questions and acknowledgement of understanding  Vitals:  Vitals Value Taken Time   BP     Temp     Pulse     Resp     SpO2         Electronically Signed By: RASHEEDA Peaz CRNA  February 16, 2024  10:13 AM

## 2024-02-26 LAB
PATH REPORT.COMMENTS IMP SPEC: NORMAL
PATH REPORT.COMMENTS IMP SPEC: NORMAL
PATH REPORT.FINAL DX SPEC: NORMAL
PATH REPORT.GROSS SPEC: NORMAL
PATH REPORT.MICROSCOPIC SPEC OTHER STN: NORMAL
PATH REPORT.RELEVANT HX SPEC: NORMAL
PHOTO IMAGE: NORMAL

## 2024-02-28 ENCOUNTER — OFFICE VISIT (OUTPATIENT)
Dept: FAMILY MEDICINE | Facility: CLINIC | Age: 29
End: 2024-02-28
Payer: COMMERCIAL

## 2024-02-28 VITALS
OXYGEN SATURATION: 98 % | TEMPERATURE: 98.8 F | HEART RATE: 44 BPM | DIASTOLIC BLOOD PRESSURE: 66 MMHG | WEIGHT: 194 LBS | RESPIRATION RATE: 16 BRPM | SYSTOLIC BLOOD PRESSURE: 114 MMHG | BODY MASS INDEX: 35.7 KG/M2 | HEIGHT: 62 IN

## 2024-02-28 DIAGNOSIS — Z78.9 VEGETARIAN: ICD-10-CM

## 2024-02-28 DIAGNOSIS — R87.613 HSIL (HIGH GRADE SQUAMOUS INTRAEPITHELIAL LESION) ON PAP SMEAR OF CERVIX: ICD-10-CM

## 2024-02-28 DIAGNOSIS — E66.812 CLASS 2 SEVERE OBESITY DUE TO EXCESS CALORIES WITH SERIOUS COMORBIDITY AND BODY MASS INDEX (BMI) OF 35.0 TO 35.9 IN ADULT (H): Primary | ICD-10-CM

## 2024-02-28 DIAGNOSIS — G43.709 CHRONIC MIGRAINE WITHOUT AURA WITHOUT STATUS MIGRAINOSUS, NOT INTRACTABLE: ICD-10-CM

## 2024-02-28 DIAGNOSIS — K21.9 GASTROESOPHAGEAL REFLUX DISEASE WITHOUT ESOPHAGITIS: ICD-10-CM

## 2024-02-28 DIAGNOSIS — Z78.9 VEGETARIAN DIET: ICD-10-CM

## 2024-02-28 DIAGNOSIS — R41.840 INATTENTION: ICD-10-CM

## 2024-02-28 DIAGNOSIS — E66.01 CLASS 2 SEVERE OBESITY DUE TO EXCESS CALORIES WITH SERIOUS COMORBIDITY AND BODY MASS INDEX (BMI) OF 35.0 TO 35.9 IN ADULT (H): Primary | ICD-10-CM

## 2024-02-28 PROCEDURE — 99417 PROLNG OP E/M EACH 15 MIN: CPT | Performed by: NURSE PRACTITIONER

## 2024-02-28 PROCEDURE — 99215 OFFICE O/P EST HI 40 MIN: CPT | Performed by: NURSE PRACTITIONER

## 2024-02-28 RX ORDER — METHYLPHENIDATE HYDROCHLORIDE 10 MG/1
10 CAPSULE, EXTENDED RELEASE ORAL EVERY MORNING
Qty: 30 CAPSULE | Refills: 0 | Status: SHIPPED | OUTPATIENT
Start: 2024-02-28 | End: 2024-08-12 | Stop reason: DRUGHIGH

## 2024-02-28 RX ORDER — DULOXETIN HYDROCHLORIDE 20 MG/1
CAPSULE, DELAYED RELEASE ORAL
COMMUNITY
Start: 2024-02-09 | End: 2024-03-27

## 2024-02-28 RX ORDER — TOPIRAMATE 50 MG/1
50 TABLET, FILM COATED ORAL DAILY
Qty: 60 TABLET | Refills: 0 | Status: SHIPPED | OUTPATIENT
Start: 2024-02-28 | End: 2024-03-27

## 2024-02-28 NOTE — PATIENT INSTRUCTIONS
"[facility] centers scheduling number [phone]     --Eat real foods that are high in natural fats (meats, Ghee, avocado oil, extra virgin olive oil coconut oil, butter, natural nut butters, nuts, avocados, full fat dairy). Avoid canola, vegetable, corn, soybean, and seed oils.   --Eat real foods that are fermented (Full-fat yogurt, Davion kraut, kombucha, kimichi, pickles)  --Eat real foods that  are high in fiber (fruits, vegetables). (Berries and green leafy veggies)  --Avoid or eliminate refined processed carbohydrates (foods with added sugar, ice cream, deserts, chips, crackers, bread, tortillas, bagels, pasta, junk food, and fast food).   50-70 grams of carbs/day is a place to start.   --Avoid or eliminate sugar sweetened beverages (pop, sports drinks, sweetened tea/coffee, juice)  --Avoid foods that contain high fructose corn syrup.  --Find low carb substitutes for bread, pasta, deserts (zoodles, fathead pizza, spaghetti squash, Wonder/Miracle noodles, shirataki noodles, low carb bread bread, cauliflower rice).  --Consider having a fasting period of 12-16 hours every day.  --Walk 30 minutes daily.  --Sleep 7-9 hours of uninterrupted sleep every night.  --Decrease chronic stress, or increase stress reducing activities.    Resources    Websites  Dietdoctor.[email or web address]  ketonutrition.org  [email or web address]  precisionnutrition.com  2kJJS Media.com    Documentaries  Fed Up (amazon prime)  Fat Fiction (amazon Prime)    Podcasts  Pursuing Health Episode 150 \"Our Approach to Nutrition\"  The Obesity Code podcast  2Ketodudes  The Doctor's Farmacy  Diet doctor podcast    YouTube  Dr Sherman Pompa: Readdressing Dietary Guidelines  Dr Sherman Pompa: What if we are wrong about Diabetes? (Yohannes Talk)  Dr Andrés Rico The Aetiolgy of Obesity  Jamir Vernon Sleep (Yohannes Talk)  Dr Lamar Segura (Tedx Talk) \"Reversing Type 2 Diabetes Starts with Ignoring the Guidelines\"  Zipidee    Books:  The Obesity Code by Dr Andrés Rico  The Case for Keto by " Checo Saleem  Eat Fat, Get Thin by Dr Viktor Mckoy  The Case Against Sugar by Checo Saleem  Why We Get Fat by Checo Saleem  Always Hungry by Dr Bart Colby  Fat Chance by Dr Jhoan Lau  Keto Clarity by Dr Dominic Burnett  The Art and Science of Low Carbohydrate Living by Jere Colon and Abner Gallardo    Apps  Carb Manager: Keto diet tracker and macros counter  ItrackBites: diet Tracker and weight loss diary  Calorie, carb & Fat counter  KetoManager: Low Carb Diet Tracker, Macro counter  My Fitness Pal

## 2024-02-28 NOTE — PROGRESS NOTES
Assessment & Plan   Problem List Items Addressed This Visit       Inattention     Had the neuropsych testing completed, was positive for ADHD except would not give her the diagnosis due to inability finding a appropriate family source from her childhood, Layne states that her family of origin is not a reliable and she was unable to get a reliable witness regarding her childhood.    Will trial methylphenidate 10 mg to see if that will improve her inattention, daytime somnolence.  If she find that it does not help then we could consider other treatments including modafinil, guanfacine, Strattera.    If this works in the future we will do a controlled substance agreement as well as a urine drug screening.         Relevant Medications    methylphenidate (METADATE CD) 10 MG CR capsule    Vegetarian diet     Will retest vitamin B12         Chronic migraine without aura without status migrainosus, not intractable     Topiramate 50 mg once daily to help with reducing the frequency of migraine headaches.         Relevant Medications    DULoxetine (CYMBALTA) 20 MG capsule    topiramate (TOPAMAX) 50 MG tablet    Gastroesophageal reflux disease without esophagitis    Relevant Medications    topiramate (TOPAMAX) 50 MG tablet    Class 2 severe obesity due to excess calories with serious comorbidity in adult (H) - Primary     Wt Readings from Last 4 Encounters:   03/01/24 89.9 kg (198 lb 3.2 oz)   02/28/24 88 kg (194 lb)   02/16/24 90.3 kg (199 lb)   02/02/24 90.3 kg (199 lb)     BMI: 36  Phenotype/etiology of weight gain: Given the weight with unhealthy eating habits, increase in stress, sedentary behavior during her college  Labs pending: Fasting insulin, CMP, vitamin D, vitamin B12, TSH, liver ultrasound to assess for nonalcoholic fatty liver disease.  Comorbid conditions: Endometriosis, GERD, IBS   Goal 170s    Nutrition/food Plan: Has a challenge of a vegetarian diet, in the sense that it may be lowering protein, tends tend  "to eat a lot of whole real foods.  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 No history of Pancreatitis or Medullary Thyroid cancer.  She is a [employer] employee so we will do the evaluation to evaluate for nonalcoholic fatty liver disease as well.  To assess if she will meet criteria and for using the antiobesity medications.           Relevant Medications    methylphenidate (METADATE CD) 10 MG CR capsule    Other Relevant Orders    Insulin level    Comprehensive metabolic panel    Lipid panel reflex to direct LDL Fasting    Vitamin D Deficiency    TSH with free T4 reflex    Hemoglobin A1c    US Abdomen Limited    HSIL (high grade squamous intraepithelial lesion) on Pap smear of cervix     Had a LEEP procedure done 2/2024 with clear margins.  JAVIER-3 found on cervix          Other Visit Diagnoses       Vegetarian        Relevant Orders    Vitamin B12                BMI  Estimated body mass index is 35.48 kg/m  as calculated from the following:    Height as of this encounter: 1.575 m (5' 2\").    Weight as of this encounter: 88 kg (194 lb).   Weight management plan: see above    FUTURE APPOINTMENTS:       - Follow-up visit in 4-6 weeks    75 minutes was spent on chart review, gathering history, counseling on nutrition, etiology of obesity, antiobesity medications.    Lizzei Chahal is a 28 year old, presenting for the following health issues:  return weight    History of Present Illness       Reason for visit:  Weight gain  Symptom onset:  More " than a month  Symptoms include:  Weight gain despite diet change and increased exercise  Symptom intensity:  Moderate  Symptom progression:  Worsening  Had these symptoms before:  Yes  Has tried/received treatment for these symptoms:  No  What makes it worse:  No  What makes it better:  No    She eats 2-3 servings of fruits and vegetables daily.She consumes 0 sweetened beverage(s) daily.She exercises with enough effort to increase her heart rate 30 to 60 minutes per day.  She exercises with enough effort to increase her heart rate 4 days per week.   She is taking medications regularly.       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 Sedentary, high stress, emotionally eating, unhealthy eating habits.  regained weight after losing within the past year. Down to 180 lbs.   Current weight:194  BMI 35 Obesity class (severity): 2  Comorbid conditions include: Anxiety  Daytime somlence and inattention  Endometriosis  Pelvic pain  CIN3 on cervix  Migraines.    Readiness for change contemplative.  Goal: 170s    Sleep/snoring:sleeping well at nighttime, having difficulty with daytime somlence, inattention  Stress: history of PTSD, anxiety, depression, no support system.   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     Weight when felt their best 180.    Weight gain precipitating factors: college, nursing school.   No pregnancy  Challenges/Barriers: mental health aspect of food/stress emotional area, thoughts of food, wants the food but not the physical hunger   Improving factors: cardio workouts, lifting weights.   Previous attempts of losing weight lost weight. Weight regained 20 lbs.     Drivers of eating behavior: emotional stress  "eating, desiring food but then feel unsatisfied.   Nighttime eating occasional snack after dinner.   Binge eating/eating disorders none.   Emotional eating yes.   Any medications or health conditions that contributed none.   Cravings, compulsions: sweets, salty,  When you eat certain foods, can you easily stop? maybe  Or must you continue until gone? Yes can stop  Satiety: hungry around meals  Meals and snacks (vegetarian) Timing breakfast variable; sometimes 5 am-9 am oatmeal or toast, banana. Lunch: bringing her own lunch from home (rice/chickpeas, teriyaki sauce/koenig sauce, greek yogurt. Dinner: veggie burger , salmon, fish filets, veggies broccoli, carrots, fried rice veggies and tofu Frequency every 3-4 hours. Nutritional content whole foods.   Who prepares the food patients to cook. Access to foods none Location of home food kitchen. consumption table, sometimes, at times at the couch.   Location of away food consumption some restaurants.     24 hour food recall toast peanut butter, rice chickpeas, koenig sauce, and greek yogurt. Dinner: veggie burger, bread greek yogurt. .         Review of Systems  Constitutional, HEENT, cardiovascular, pulmonary, gi and gu systems are negative, except as otherwise noted.  Anxiety  Daytime somlence and inattention  Endometriosis  Pelvic pain  CIN3 on cervix  Migraines      Objective    /66 (BP Location: Right arm, Patient Position: Sitting, Cuff Size: Adult Large)   Pulse (!) 44   Temp 98.8  F (37.1  C) (Tympanic)   Resp 16   Ht 1.575 m (5' 2\")   Wt 88 kg (194 lb)   LMP  (LMP Unknown)   SpO2 98%   BMI 35.48 kg/m    Body mass index is 35.48 kg/m .  Physical Exam  Constitutional:       Appearance: Normal appearance.   HENT:      Head: Normocephalic.   Pulmonary:      Effort: Pulmonary effort is normal. No respiratory distress.   Skin:     General: Skin is warm and dry.   Neurological:      Mental Status: She is alert and oriented to person, place, and time. "   Psychiatric:         Mood and Affect: Mood normal.         Behavior: Behavior normal.         Thought Content: Thought content normal.         Judgment: Judgment normal.                    Signed Electronically by: RASHEEDA Love CNP

## 2024-03-01 ENCOUNTER — OFFICE VISIT (OUTPATIENT)
Dept: OBGYN | Facility: CLINIC | Age: 29
End: 2024-03-01
Attending: STUDENT IN AN ORGANIZED HEALTH CARE EDUCATION/TRAINING PROGRAM
Payer: COMMERCIAL

## 2024-03-01 VITALS
HEIGHT: 62 IN | SYSTOLIC BLOOD PRESSURE: 111 MMHG | WEIGHT: 198.2 LBS | HEART RATE: 61 BPM | DIASTOLIC BLOOD PRESSURE: 77 MMHG | BODY MASS INDEX: 36.47 KG/M2

## 2024-03-01 DIAGNOSIS — R10.2 PELVIC PAIN: Primary | ICD-10-CM

## 2024-03-01 PROCEDURE — 99213 OFFICE O/P EST LOW 20 MIN: CPT | Performed by: STUDENT IN AN ORGANIZED HEALTH CARE EDUCATION/TRAINING PROGRAM

## 2024-03-01 PROCEDURE — 99212 OFFICE O/P EST SF 10 MIN: CPT | Mod: 24 | Performed by: STUDENT IN AN ORGANIZED HEALTH CARE EDUCATION/TRAINING PROGRAM

## 2024-03-01 PROCEDURE — 99024 POSTOP FOLLOW-UP VISIT: CPT | Performed by: STUDENT IN AN ORGANIZED HEALTH CARE EDUCATION/TRAINING PROGRAM

## 2024-03-01 RX ORDER — NORETHINDRONE ACETATE AND ETHINYL ESTRADIOL 1MG-20(21)
1 KIT ORAL DAILY
Qty: 84 TABLET | Refills: 3 | Status: SHIPPED | OUTPATIENT
Start: 2024-03-01 | End: 2024-07-31

## 2024-03-01 NOTE — LETTER
March 1, 2024      RE: Fela Solorzano  [address]  [place] [address]       To whom it may concern:    Fela Solorzano was seen in our clinic today. She  may return to work with the following: No working or lifting restrictions on 3/1/24.    Sincerely,      Soha Espinoza MD

## 2024-03-01 NOTE — LETTER
"3/1/2024       RE: Fela Solorzano  [address]  [place] [address]     Dear Colleague,    Thank you for referring your patient, Fela Solorzano, to the [facility] [place] at [facility]. Please see a copy of my visit note below.    [facility]  Postoperative Visit  3/1/2024    S: Fela Solorzano is a 28 year old  here for post-operative visit following:  - diagnostic laparoscopy, pap smear on 2024  - LEEP on 2024   She reports feeling well. Overall recovering from both procedures. Incisional pain well-controlled. Vaginal bleeding resolved. Unsure if pelvic pain is different from preoperative, but would like to add COCPs on top of IUD. Feels like pain was worse on COCPs compared to IUD but happens more frequently with IUD only.    O:   /77   Pulse 61   Ht 1.575 m (5' 2\")   Wt 89.9 kg (198 lb 3.2 oz)   LMP  (LMP Unknown)   BMI 36.25 kg/m    Exam:   Gen: appears well  CV: regular rate  Resp: normal respiratory effort    Pathology:   Cervix, loop electrocautery excision procedure (LEEP):  -High grade squamous intraepithelial lesion (cervical intraepithelial neoplasia 3), anterior cervix  -Surgical margins of negative for high-grade dysplasia or malignancy    A: 28 year old female POD#28 s/p diagnostic laparoscopy, pap smear, POD#14 after LEEP. Doing well, no concerns    P:   Postoperative state  - doing well, ok to return to usual activities  Cervical dysplasia  - CIN3 on LEEP, negative margins, plan pap in 6 months  Chronic pelvic pain   - IUD in place, Rx for continuous COCPs sent    Follow-up in 6 months       Soha Espinoza MD  Ob/Gyn  3/1/2024      "

## 2024-03-01 NOTE — PROGRESS NOTES
"[facility]  Postoperative Visit  3/1/2024    S: Fela Solorzano is a 28 year old  here for post-operative visit following:  - diagnostic laparoscopy, pap smear on 2024  - LEEP on 2024   She reports feeling well. Overall recovering from both procedures. Incisional pain well-controlled. Vaginal bleeding resolved. Unsure if pelvic pain is different from preoperative, but would like to add COCPs on top of IUD. Feels like pain was worse on COCPs compared to IUD but happens more frequently with IUD only.    O:   /77   Pulse 61   Ht 1.575 m (5' 2\")   Wt 89.9 kg (198 lb 3.2 oz)   LMP  (LMP Unknown)   BMI 36.25 kg/m    Exam:   Gen: appears well  CV: regular rate  Resp: normal respiratory effort    Pathology:   Cervix, loop electrocautery excision procedure (LEEP):  -High grade squamous intraepithelial lesion (cervical intraepithelial neoplasia 3), anterior cervix  -Surgical margins of negative for high-grade dysplasia or malignancy    A: 28 year old female POD#28 s/p diagnostic laparoscopy, pap smear, POD#14 after LEEP. Doing well, no concerns    P:   Postoperative state  - doing well, ok to return to usual activities  Cervical dysplasia  - CIN3 on LEEP, negative margins, plan pap in 6 months  Chronic pelvic pain   - IUD in place, Rx for continuous COCPs sent    Follow-up in 6 months       Soha Espinoza MD  Ob/Gyn  3/1/2024        "

## 2024-03-01 NOTE — PATIENT INSTRUCTIONS
Thank you for trusting us with your care!     If you need to contact us for questions about:  Symptoms, Scheduling & Medical Questions; Non-urgent (2-3 day response) Damien message, Urgent (needing response today) [phone] (if after 3:30pm next day response)   Prescriptions: Please call your Pharmacy   Billing: [facility] [phone] or [facility]:[phone]

## 2024-03-02 NOTE — ASSESSMENT & PLAN NOTE
Placed at .  LM for patient    Topiramate 50 mg once daily to help with reducing the frequency of migraine headaches.

## 2024-03-02 NOTE — ASSESSMENT & PLAN NOTE
Had the neuropsych testing completed, was positive for ADHD except would not give her the diagnosis due to inability finding a appropriate family source from her childhood, Layne states that her family of origin is not a reliable and she was unable to get a reliable witness regarding her childhood.    Will trial methylphenidate 10 mg to see if that will improve her inattention, daytime somnolence.  If she find that it does not help then we could consider other treatments including modafinil, guanfacine, Strattera.    If this works in the future we will do a controlled substance agreement as well as a urine drug screening.

## 2024-03-02 NOTE — ASSESSMENT & PLAN NOTE
Wt Readings from Last 4 Encounters:   03/01/24 89.9 kg (198 lb 3.2 oz)   02/28/24 88 kg (194 lb)   02/16/24 90.3 kg (199 lb)   02/02/24 90.3 kg (199 lb)       BMI: 36  Phenotype/etiology of weight gain: Given the weight with unhealthy eating habits, increase in stress, sedentary behavior during her college  Labs pending: Fasting insulin, CMP, vitamin D, vitamin B12, TSH, liver ultrasound to assess for nonalcoholic fatty liver disease.  Comorbid conditions: Endometriosis, GERD, IBS   Goal 170s    Nutrition/food Plan: Has a challenge of a vegetarian diet, in the sense that it may be lowering protein, tends tend to eat a lot of whole real foods.  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 No history of Pancreatitis or Medullary Thyroid cancer.  She is a [employer] employee so we will do the evaluation to evaluate for nonalcoholic fatty liver disease as well.  To assess if she will meet criteria and for using the antiobesity medications.

## 2024-03-06 ENCOUNTER — PATIENT OUTREACH (OUTPATIENT)
Dept: CARE COORDINATION | Facility: CLINIC | Age: 29
End: 2024-03-06
Payer: COMMERCIAL

## 2024-03-20 ENCOUNTER — LAB (OUTPATIENT)
Dept: LAB | Facility: CLINIC | Age: 29
End: 2024-03-20
Payer: COMMERCIAL

## 2024-03-20 ENCOUNTER — HOSPITAL ENCOUNTER (OUTPATIENT)
Dept: ULTRASOUND IMAGING | Facility: CLINIC | Age: 29
Discharge: HOME | End: 2024-03-20
Attending: NURSE PRACTITIONER | Admitting: NURSE PRACTITIONER
Payer: COMMERCIAL

## 2024-03-20 DIAGNOSIS — E66.01 CLASS 2 SEVERE OBESITY DUE TO EXCESS CALORIES WITH SERIOUS COMORBIDITY AND BODY MASS INDEX (BMI) OF 35.0 TO 35.9 IN ADULT (H): ICD-10-CM

## 2024-03-20 DIAGNOSIS — E66.812 CLASS 2 SEVERE OBESITY DUE TO EXCESS CALORIES WITH SERIOUS COMORBIDITY AND BODY MASS INDEX (BMI) OF 35.0 TO 35.9 IN ADULT (H): ICD-10-CM

## 2024-03-20 DIAGNOSIS — Z78.9 VEGETARIAN: ICD-10-CM

## 2024-03-20 LAB
ALBUMIN SERPL BCG-MCNC: 4.6 G/DL (ref 3.5–5.2)
ALP SERPL-CCNC: 76 U/L (ref 40–150)
ALT SERPL W P-5'-P-CCNC: 10 U/L (ref 0–50)
ANION GAP SERPL CALCULATED.3IONS-SCNC: 13 MMOL/L (ref 7–15)
AST SERPL W P-5'-P-CCNC: 16 U/L (ref 0–45)
BILIRUB SERPL-MCNC: 0.6 MG/DL
BUN SERPL-MCNC: 13.7 MG/DL (ref 6–20)
CALCIUM SERPL-MCNC: 9.3 MG/DL (ref 8.6–10)
CHLORIDE SERPL-SCNC: 103 MMOL/L (ref 98–107)
CHOLEST SERPL-MCNC: 181 MG/DL
CREAT SERPL-MCNC: 0.91 MG/DL (ref 0.51–0.95)
DEPRECATED HCO3 PLAS-SCNC: 22 MMOL/L (ref 22–29)
EGFRCR SERPLBLD CKD-EPI 2021: 89 ML/MIN/1.73M2
FASTING STATUS PATIENT QL REPORTED: YES
GLUCOSE SERPL-MCNC: 83 MG/DL (ref 70–99)
HBA1C MFR BLD: 5.4 %
HDLC SERPL-MCNC: 68 MG/DL
INSULIN SERPL-ACNC: 10.8 UU/ML (ref 2.6–24.9)
LDLC SERPL CALC-MCNC: 88 MG/DL
NONHDLC SERPL-MCNC: 113 MG/DL
POTASSIUM SERPL-SCNC: 3.4 MMOL/L (ref 3.4–5.3)
PROT SERPL-MCNC: 7.3 G/DL (ref 6.4–8.3)
SODIUM SERPL-SCNC: 138 MMOL/L (ref 135–145)
TRIGL SERPL-MCNC: 127 MG/DL
TSH SERPL DL<=0.005 MIU/L-ACNC: 3.89 UIU/ML (ref 0.3–4.2)
VIT B12 SERPL-MCNC: 411 PG/ML (ref 232–1245)
VIT D+METAB SERPL-MCNC: 55 NG/ML (ref 20–50)

## 2024-03-20 PROCEDURE — 82607 VITAMIN B-12: CPT | Performed by: NURSE PRACTITIONER

## 2024-03-20 PROCEDURE — 76705 ECHO EXAM OF ABDOMEN: CPT | Mod: 26 | Performed by: RADIOLOGY

## 2024-03-20 PROCEDURE — 84443 ASSAY THYROID STIM HORMONE: CPT | Performed by: NURSE PRACTITIONER

## 2024-03-20 PROCEDURE — 82306 VITAMIN D 25 HYDROXY: CPT | Performed by: NURSE PRACTITIONER

## 2024-03-20 PROCEDURE — 76705 ECHO EXAM OF ABDOMEN: CPT

## 2024-03-20 PROCEDURE — 80053 COMPREHEN METABOLIC PANEL: CPT | Performed by: NURSE PRACTITIONER

## 2024-03-20 PROCEDURE — 99000 SPECIMEN HANDLING OFFICE-LAB: CPT | Performed by: PATHOLOGY

## 2024-03-20 PROCEDURE — 83036 HEMOGLOBIN GLYCOSYLATED A1C: CPT | Performed by: NURSE PRACTITIONER

## 2024-03-20 PROCEDURE — 80061 LIPID PANEL: CPT | Performed by: NURSE PRACTITIONER

## 2024-03-20 PROCEDURE — 36415 COLL VENOUS BLD VENIPUNCTURE: CPT | Performed by: NURSE PRACTITIONER

## 2024-03-20 PROCEDURE — 83525 ASSAY OF INSULIN: CPT | Performed by: NURSE PRACTITIONER

## 2024-03-27 ENCOUNTER — OFFICE VISIT (OUTPATIENT)
Dept: FAMILY MEDICINE | Facility: CLINIC | Age: 29
End: 2024-03-27
Payer: COMMERCIAL

## 2024-03-27 VITALS
HEART RATE: 72 BPM | OXYGEN SATURATION: 100 % | HEIGHT: 62 IN | BODY MASS INDEX: 34.78 KG/M2 | RESPIRATION RATE: 16 BRPM | WEIGHT: 189 LBS | SYSTOLIC BLOOD PRESSURE: 120 MMHG | DIASTOLIC BLOOD PRESSURE: 78 MMHG | TEMPERATURE: 99.1 F

## 2024-03-27 DIAGNOSIS — K21.9 GASTROESOPHAGEAL REFLUX DISEASE WITHOUT ESOPHAGITIS: ICD-10-CM

## 2024-03-27 DIAGNOSIS — G43.709 CHRONIC MIGRAINE WITHOUT AURA WITHOUT STATUS MIGRAINOSUS, NOT INTRACTABLE: ICD-10-CM

## 2024-03-27 DIAGNOSIS — F41.1 GENERALIZED ANXIETY DISORDER: ICD-10-CM

## 2024-03-27 DIAGNOSIS — Z79.899 CONTROLLED SUBSTANCE AGREEMENT SIGNED: ICD-10-CM

## 2024-03-27 DIAGNOSIS — F90.0 ADHD (ATTENTION DEFICIT HYPERACTIVITY DISORDER), INATTENTIVE TYPE: Primary | ICD-10-CM

## 2024-03-27 DIAGNOSIS — E66.811 CLASS 1 OBESITY DUE TO EXCESS CALORIES WITHOUT SERIOUS COMORBIDITY WITH BODY MASS INDEX (BMI) OF 34.0 TO 34.9 IN ADULT: ICD-10-CM

## 2024-03-27 DIAGNOSIS — E66.09 CLASS 1 OBESITY DUE TO EXCESS CALORIES WITHOUT SERIOUS COMORBIDITY WITH BODY MASS INDEX (BMI) OF 34.0 TO 34.9 IN ADULT: ICD-10-CM

## 2024-03-27 PROCEDURE — 99214 OFFICE O/P EST MOD 30 MIN: CPT | Mod: 24 | Performed by: NURSE PRACTITIONER

## 2024-03-27 PROCEDURE — 96127 BRIEF EMOTIONAL/BEHAV ASSMT: CPT | Performed by: NURSE PRACTITIONER

## 2024-03-27 RX ORDER — METHYLPHENIDATE HYDROCHLORIDE 5 MG/1
5 TABLET ORAL DAILY
Qty: 30 TABLET | Refills: 0 | Status: SHIPPED | OUTPATIENT
Start: 2024-03-27 | End: 2024-05-06

## 2024-03-27 RX ORDER — DULOXETIN HYDROCHLORIDE 20 MG/1
20 CAPSULE, DELAYED RELEASE ORAL DAILY
Qty: 90 CAPSULE | Refills: 3 | Status: SHIPPED | OUTPATIENT
Start: 2024-03-27

## 2024-03-27 RX ORDER — TOPIRAMATE 50 MG/1
50 TABLET, FILM COATED ORAL DAILY
Qty: 90 TABLET | Refills: 3 | Status: SHIPPED | OUTPATIENT
Start: 2024-03-27

## 2024-03-27 RX ORDER — BUPROPION HYDROCHLORIDE 150 MG/1
150 TABLET ORAL EVERY MORNING
Qty: 90 TABLET | Refills: 1 | Status: SHIPPED | OUTPATIENT
Start: 2024-03-27 | End: 2024-09-12

## 2024-03-27 RX ORDER — METHYLPHENIDATE HYDROCHLORIDE EXTENDED RELEASE 10 MG/1
10 TABLET ORAL EVERY MORNING
Qty: 30 TABLET | Refills: 0 | Status: SHIPPED | OUTPATIENT
Start: 2024-03-27 | End: 2024-03-27

## 2024-03-27 RX ORDER — BUSPIRONE HYDROCHLORIDE 15 MG/1
15 TABLET ORAL DAILY
Qty: 90 TABLET | Refills: 3 | Status: SHIPPED | OUTPATIENT
Start: 2024-03-27

## 2024-03-27 RX ORDER — METHYLPHENIDATE HYDROCHLORIDE 10 MG/1
10 CAPSULE, EXTENDED RELEASE ORAL EVERY MORNING
Qty: 30 CAPSULE | Refills: 0 | Status: SHIPPED | OUTPATIENT
Start: 2024-05-27 | End: 2024-08-12

## 2024-03-27 RX ORDER — METHYLPHENIDATE HYDROCHLORIDE 10 MG/1
10 CAPSULE, EXTENDED RELEASE ORAL EVERY MORNING
Qty: 30 CAPSULE | Refills: 0 | Status: SHIPPED | OUTPATIENT
Start: 2024-04-27 | End: 2024-08-12

## 2024-03-27 RX ORDER — METHYLPHENIDATE HYDROCHLORIDE 10 MG/1
10 CAPSULE, EXTENDED RELEASE ORAL EVERY MORNING
Qty: 30 CAPSULE | Refills: 0 | Status: SHIPPED | OUTPATIENT
Start: 2024-03-27 | End: 2024-08-12

## 2024-03-27 ASSESSMENT — PATIENT HEALTH QUESTIONNAIRE - PHQ9
SUM OF ALL RESPONSES TO PHQ QUESTIONS 1-9: 6
10. IF YOU CHECKED OFF ANY PROBLEMS, HOW DIFFICULT HAVE THESE PROBLEMS MADE IT FOR YOU TO DO YOUR WORK, TAKE CARE OF THINGS AT HOME, OR GET ALONG WITH OTHER PEOPLE: SOMEWHAT DIFFICULT
SUM OF ALL RESPONSES TO PHQ QUESTIONS 1-9: 6

## 2024-03-27 ASSESSMENT — PAIN SCALES - GENERAL: PAINLEVEL: NO PAIN (0)

## 2024-03-27 NOTE — PATIENT INSTRUCTIONS
[facility]    Here is the pricing for the compounding [employer].   0.25 x 4  costs approx. 225$  0.5 x 4  costs approx. 260$  1.0 x 4 costs approx. 310$  1.5 x 4 costs approx. 345$    2.0 x 4 costs approx. 395$  2.5 x 4 costs approx. 410$

## 2024-03-27 NOTE — PROGRESS NOTES
Assessment & Plan   Problem List Items Addressed This Visit       ADHD (attention deficit hyperactivity disorder), inattentive type - Primary     Methylphenidate 10 mg CR is working well for her.  Three 1 month prescriptions have been given to her.  No major side effects.  Methylphenidate 5 mg IR for afternoon         Relevant Medications    methylphenidate (RITALIN) 5 MG tablet    methylphenidate (METADATE CD) 10 MG CR capsule (Start on 4/27/2024)    methylphenidate (METADATE CD) 10 MG CR capsule (Start on 5/27/2024)    methylphenidate (METADATE CD) 10 MG CR capsule    Chronic migraine without aura without status migrainosus, not intractable    Relevant Medications    topiramate (TOPAMAX) 50 MG tablet    buPROPion (WELLBUTRIN XL) 150 MG 24 hr tablet    DULoxetine (CYMBALTA) 20 MG capsule    Generalized anxiety disorder     Does have a mental health provider.  She would like to consider having me take over her prescriptions.  She would like to try to decrease her mental health medications.  She is trying reducing Cymbalta and had a lot of withdrawal like to continue at 20 mg for now.  Will decrease Wellbutrin to 150 mg.  And consider discontinuing it in the future.  BuSpar is working well for her at 15 mg once daily.  She would like to continue that.  Overall she feels her mental health is actually much better.  She is felt a huge improvement with the methylphenidate for her fatigue and focus during the day.         Relevant Medications    topiramate (TOPAMAX) 50 MG tablet    buPROPion (WELLBUTRIN XL) 150 MG 24 hr tablet    busPIRone (BUSPAR) 15 MG tablet    DULoxetine (CYMBALTA) 20 MG capsule    Gastroesophageal reflux disease without esophagitis    Class 1 obesity due to excess calories in adult     Wt Readings from Last 4 Encounters:   03/27/24 85.7 kg (189 lb)   03/01/24 89.9 kg (198 lb 3.2 oz)   02/28/24 88 kg (194 lb)   02/16/24 90.3 kg (199 lb)   Down 9 pounds.  Initial weight 199  Initial BMI 36 current  BMI 34  Goal 170-180    Phenotype  Comorbid conditions endometriosis, GERD, IBS      Homeostatic Model Assessment of Insulin Resistance  Vivi-IR: Insulin mU/L 10 x  Glucose mg/dL 83  = 2.0  Reference ranges:  Healthy Range: 1.0 (0.5-1.4)  Less than 1.0 means you are insulin-sensitive which is optimal  Above 1.9 indicates early insulin resistance  Above 2.9 indicates significant insulin resistance    Fasting insulin 10, hemoglobin A1c 5.4 she may have very very early insulin resistance.    Waist: 39.75  Hip: 43.75  Waist to Hip Ratio: 0.91    Female  Waist to hip ratio reference ranges  0.80 or lower Low health risk  0.81 to 0.84 Moderate risk  0.85 or higher High risk    Nutrition: Continues on her [employer] vegetarian diet, I would recommend that she focus on healthy proteins that are plant-based in the morning and at lunch and trying to avoid more of the refined carbohydrates during those times.  Exercise continue with her [employer] classes daily  Medications: She is not on any antiobesity medicines.  She is taking methylphenidate to help with ADHD symptoms.  Which may suppress her appetite somewhat during the day.           Relevant Medications    methylphenidate (RITALIN) 5 MG tablet    methylphenidate (METADATE CD) 10 MG CR capsule (Start on 4/27/2024)    methylphenidate (METADATE CD) 10 MG CR capsule (Start on 5/27/2024)    methylphenidate (METADATE CD) 10 MG CR capsule    Controlled substance agreement signed 3/27/2024     Patient is followed by [facility] for ongoing prescription of stimulants.  All refills should be approved by this provider, or covering partner.    Medication(s): Methylphenidate  CR 10 mg.  And methylphenidate IR 5 mg  Maximum quantity per month: 30/month  Clinic visit frequency required: Q 6  months     Controlled substance agreement on file: Yes       Date(s): 3/27/2024  Neuropsych evaluation for ADD completed:  Yes, completed 2023, on file but diagnosis not confirmed she was  unable to have a confirmation diagnosis because she did not have a outside witness from her childhood    Last [facility] website verification:  done on 3/30/2024  [email or web address]                  FUTURE APPOINTMENTS:       - Follow-up visit in 3-6 months      Lizzie Chahal is a 28 year old, presenting for the following health issues:  return weight    HPI     Return weight    Weight change since last encounter:down 9 lbs  , 189  Weight change since initial visit: Down 10 pounds, initial weight was 199  Goal: 180  Class: 34; improving   Challenges: No major challenges  Things that went well: Overall she is feeling much better, her energy is much improved with methylphenidate she is not having the overwhelming fatigue throughout the day.  Her headaches are improved as well on the topiramate.  No major side effects from topiramate or methylphenidate.  Dietary Adherence: She has not changed her dietary habits very much she continues to follow a [employer] vegetarian diet.  Exercise: young classes, are going well  Sleep: less fatigue more focus  Stress: Similar but able to manage it  Meeting goal improving.  Medication use: No medications    Mental health medications:   Anxiety, low depression with her previous job in [place]. Is working with a therpist and feels like her mental health is much improved.  She is trying going on for Cymbalta previously and has difficulty going off of it.  Has major side effects.  She does not actually think she needs it anymore.  But does not want to have to go through the withdrawal.  She would like to decrease Wellbutrin.  Daniel is working to treat her anxiety.  She would consider going off Wellbutrin long-term.    ADHD: Methylphenidate 10 mg seems to be working well for her.  Would like to continue taking this.      Review of Systems  Constitutional, HEENT, cardiovascular, pulmonary, gi and gu systems are negative, except as otherwise noted.      Objective   "  /78 (BP Location: Right arm, Patient Position: Sitting, Cuff Size: Adult Large)   Pulse 72   Temp 99.1  F (37.3  C) (Tympanic)   Resp 16   Ht 1.575 m (5' 2\")   Wt 85.7 kg (189 lb)   LMP  (LMP Unknown)   SpO2 100%   BMI 34.57 kg/m    Body mass index is 34.57 kg/m .  Physical Exam  Constitutional:       Appearance: Normal appearance.   HENT:      Head: Normocephalic.   Pulmonary:      Effort: Pulmonary effort is normal. No respiratory distress.   Skin:     General: Skin is warm and dry.   Neurological:      Mental Status: She is alert and oriented to person, place, and time.   Psychiatric:         Mood and Affect: Mood normal.         Behavior: Behavior normal.         Thought Content: Thought content normal.         Judgment: Judgment normal.                    Signed Electronically by: RASHEEDA Love CNP    Answers submitted by the patient for this visit:  Patient Health Questionnaire (Submitted on 3/27/2024)  If you checked off any problems, how difficult have these problems made it for you to do your work, take care of things at home, or get along with other people?: Somewhat difficult  PHQ9 TOTAL SCORE: 6    "

## 2024-03-27 NOTE — LETTER
[facility]  03/27/24  Patient: Fela Solorzano  YOB: 1995  Medical Record Number: [id number]                                                                                  Non-Opioid Controlled Substance Agreement    This is an agreement between you and your provider regarding safe and appropriate use of controlled substances prescribed by your care team. Controlled substances are?medicines that can cause physical and mental dependence (abuse).     There are strict laws about having and using these medicines. We here at [facility] are  committed to working with you in your efforts to get better. To support you in this work, we'll help you schedule regular office appointments for medicine refills. If we must cancel or change your appointment for any reason, we'll make sure you have enough medicine to last until your next appointment.     As a Provider, I will:   Listen carefully to your concerns while treating you with respect.   Recommend a treatment plan that I believe is in your best interest and may involve therapies other than medicine.    Talk with you often about the possible benefits and the risk of harm of any medicine that we prescribe for you.  Assess the safety of this medicine and check how well it works.    Provide a plan on how to taper (discontinue or go off) using this medicine if the decision is made to stop its use.      ::  As a Patient, I understand controlled substances:     Are prescribed by my care provider to help me function or work and manage my condition(s).?  Are strong medicines and can cause serious side effects.     Need to be taken exactly as prescribed.?Combining controlled substances with certain medicines or chemicals (such as illegal drugs, alcohol, sedatives, sleeping pills, and benzodiazepines) can be dangerous or even fatal.? If I stop taking my medicines suddenly, I may have severe withdrawal symptoms.     The risks, benefits, and  side effects of these medicine(s) were explained to me. I agree that:    I will take part in other treatments as advised by my care team. This may be psychiatry or counseling, physical therapy, behavioral therapy, group treatment or a referral to specialist.    I will keep all my appointments and understand this is part of the monitoring of controlled substances.?My care team may require an office visit for EVERY controlled substance refill. If I miss appointments or don t follow instructions, my care team may stop my medicine    I will take my medicines as prescribed. I will not change the dose or schedule unless my care team tells me to. There will be no refills if I run out early.      I may be asked to come to the clinic and complete a urine drug test or complete a pill count. If I don t give a urine sample or participate in a pill count, the care team may stop my medicine.    I will only receive controlled substance prescriptions from this clinic. If I am treated by another provider, I will tell them that I am taking controlled substances and that I have a treatment agreement with this provider. I will inform my [facility] care team within one business day if I am given a prescription for any controlled substance by another healthcare provider. My [facility] care team can contact other providers and pharmacists about my use of any medicines.    It is up to me to make sure that I don't run out of my medicines on weekends or holidays.?If my care team is willing to refill my prescription without a visit, I must request refills only during office hours. Refills may take up to 3 business days to process. I will use one pharmacy to fill all my controlled substance prescriptions. I will notify the clinic about any changes to my insurance or medicine availability.    I am responsible for my prescriptions. If the medicine/prescription is lost, stolen or destroyed, it will not be replaced.?I also agree not  to share controlled substance medicines with anyone.     I am aware I should not use any illegal or recreational drugs. I agree not to drink alcohol unless my care team says I can.     If I enroll in the [place] Medical Cannabis program, I will tell my care team before my next refill.    I will tell my care team right away if I become pregnant, have a new medical problem treated outside of my regular clinic, or have a change in my medicines.     I understand that this medicine can affect my thinking, judgment and reaction time.? Alcohol and drugs affect the brain and body, which can affect the safety of my driving. Being under the influence of alcohol or drugs can affect my decision-making, behaviors, personal safety and the safety of others. Driving while impaired (DWI) can occur if a person is driving, operating or in physical control of a car, motorcycle, boat, snowmobile, ATV, motorbike, off-road vehicle or any other motor vehicle ([place] Statute 169A.20). I understand the risk if I choose to drive or operate any vehicle or machinery.    I understand that if I do not follow any of the conditions above, my prescriptions or treatment may be stopped or changed.   I agree that my provider, clinic care team and pharmacy may work with any city, state or federal law enforcement agency that investigates the misuse, sale or other diversion of my controlled medicine. I will allow my provider to discuss my care with, or share a copy of, this agreement with any other treating provider, pharmacy or emergency room where I receive care.     I have read this agreement and have asked questions about anything I did not understand.    ________________________________________________________  Patient Signature - Fela Solorzano     ___________________                   Date     ________________________________________________________  Provider Signature - RASHEEDA Love CNP       ___________________                   Date      ________________________________________________________  Witness Signature (required if provider not present while patient signing)          ___________________                   Date

## 2024-03-30 PROBLEM — E66.09 CLASS 1 OBESITY DUE TO EXCESS CALORIES IN ADULT: Status: ACTIVE | Noted: 2024-01-31

## 2024-03-30 PROBLEM — F90.0 ADHD (ATTENTION DEFICIT HYPERACTIVITY DISORDER), INATTENTIVE TYPE: Status: ACTIVE | Noted: 2023-03-24

## 2024-03-30 PROBLEM — Z79.899 CONTROLLED SUBSTANCE AGREEMENT SIGNED: Status: ACTIVE | Noted: 2024-03-30

## 2024-03-30 PROBLEM — E66.811 CLASS 1 OBESITY DUE TO EXCESS CALORIES IN ADULT: Status: ACTIVE | Noted: 2024-01-31

## 2024-03-30 NOTE — ASSESSMENT & PLAN NOTE
Does have a mental health provider.  She would like to consider having me take over her prescriptions.  She would like to try to decrease her mental health medications.  She is trying reducing Cymbalta and had a lot of withdrawal like to continue at 20 mg for now.  Will decrease Wellbutrin to 150 mg.  And consider discontinuing it in the future.  BuSpar is working well for her at 15 mg once daily.  She would like to continue that.  Overall she feels her mental health is actually much better.  She is felt a huge improvement with the methylphenidate for her fatigue and focus during the day.

## 2024-03-30 NOTE — ASSESSMENT & PLAN NOTE
Methylphenidate 10 mg CR is working well for her.  Three 1 month prescriptions have been given to her.  No major side effects.  Methylphenidate 5 mg IR for afternoon

## 2024-03-30 NOTE — ASSESSMENT & PLAN NOTE
Patient is followed by [facility] for ongoing prescription of stimulants.  All refills should be approved by this provider, or covering partner.    Medication(s): Methylphenidate  CR 10 mg.  And methylphenidate IR 5 mg  Maximum quantity per month: 30/month  Clinic visit frequency required: Q 6  months     Controlled substance agreement on file: Yes       Date(s): 3/27/2024  Neuropsych evaluation for ADD completed:  Yes, completed 2023, on file but diagnosis not confirmed she was unable to have a confirmation diagnosis because she did not have a outside witness from her childhood    Last [facility] website verification:  done on 3/30/2024  [email or web address]

## 2024-03-30 NOTE — ASSESSMENT & PLAN NOTE
Wt Readings from Last 4 Encounters:   03/27/24 85.7 kg (189 lb)   03/01/24 89.9 kg (198 lb 3.2 oz)   02/28/24 88 kg (194 lb)   02/16/24 90.3 kg (199 lb)     Down 9 pounds.  Initial weight 199  Initial BMI 36 current BMI 34  Goal 170-180    Phenotype  Comorbid conditions endometriosis, GERD, IBS      Homeostatic Model Assessment of Insulin Resistance  Vivi-IR: Insulin mU/L 10 x  Glucose mg/dL 83  = 2.0  Reference ranges:  Healthy Range: 1.0 (0.5-1.4)  Less than 1.0 means you are insulin-sensitive which is optimal  Above 1.9 indicates early insulin resistance  Above 2.9 indicates significant insulin resistance    Fasting insulin 10, hemoglobin A1c 5.4 she may have very very early insulin resistance.    Waist: 39.75  Hip: 43.75  Waist to Hip Ratio: 0.91    Female  Waist to hip ratio reference ranges  0.80 or lower Low health risk  0.81 to 0.84 Moderate risk  0.85 or higher High risk    Nutrition: Continues on her [employer] vegetarian diet, I would recommend that she focus on healthy proteins that are plant-based in the morning and at lunch and trying to avoid more of the refined carbohydrates during those times.  Exercise continue with her [employer] classes daily  Medications: She is not on any antiobesity medicines.  She is taking methylphenidate to help with ADHD symptoms.  Which may suppress her appetite somewhat during the day.

## 2024-04-18 ENCOUNTER — MYC MEDICAL ADVICE (OUTPATIENT)
Dept: FAMILY MEDICINE | Facility: CLINIC | Age: 29
End: 2024-04-18
Payer: COMMERCIAL

## 2024-04-28 ENCOUNTER — HEALTH MAINTENANCE LETTER (OUTPATIENT)
Age: 29
End: 2024-04-28

## 2024-05-06 ENCOUNTER — MYC REFILL (OUTPATIENT)
Dept: FAMILY MEDICINE | Facility: CLINIC | Age: 29
End: 2024-05-06
Payer: COMMERCIAL

## 2024-05-06 DIAGNOSIS — F90.0 ADHD (ATTENTION DEFICIT HYPERACTIVITY DISORDER), INATTENTIVE TYPE: ICD-10-CM

## 2024-05-07 RX ORDER — METHYLPHENIDATE HYDROCHLORIDE 5 MG/1
5 TABLET ORAL DAILY
Qty: 30 TABLET | Refills: 0 | Status: SHIPPED | OUTPATIENT
Start: 2024-05-07 | End: 2024-08-12

## 2024-06-05 ENCOUNTER — MYC MEDICAL ADVICE (OUTPATIENT)
Dept: FAMILY MEDICINE | Facility: CLINIC | Age: 29
End: 2024-06-05
Payer: COMMERCIAL

## 2024-06-05 DIAGNOSIS — F90.0 ADHD (ATTENTION DEFICIT HYPERACTIVITY DISORDER), INATTENTIVE TYPE: Primary | ICD-10-CM

## 2024-06-07 RX ORDER — DEXTROAMPHETAMINE SACCHARATE, AMPHETAMINE ASPARTATE MONOHYDRATE, DEXTROAMPHETAMINE SULFATE AND AMPHETAMINE SULFATE 2.5; 2.5; 2.5; 2.5 MG/1; MG/1; MG/1; MG/1
10 CAPSULE, EXTENDED RELEASE ORAL DAILY
Qty: 30 CAPSULE | Refills: 0 | Status: SHIPPED | OUTPATIENT
Start: 2024-07-07 | End: 2024-08-06

## 2024-06-07 RX ORDER — DEXTROAMPHETAMINE SACCHARATE, AMPHETAMINE ASPARTATE MONOHYDRATE, DEXTROAMPHETAMINE SULFATE AND AMPHETAMINE SULFATE 2.5; 2.5; 2.5; 2.5 MG/1; MG/1; MG/1; MG/1
10 CAPSULE, EXTENDED RELEASE ORAL DAILY
Qty: 30 CAPSULE | Refills: 0 | Status: SHIPPED | OUTPATIENT
Start: 2024-06-07 | End: 2024-07-07

## 2024-06-07 RX ORDER — DEXTROAMPHETAMINE SACCHARATE, AMPHETAMINE ASPARTATE MONOHYDRATE, DEXTROAMPHETAMINE SULFATE AND AMPHETAMINE SULFATE 2.5; 2.5; 2.5; 2.5 MG/1; MG/1; MG/1; MG/1
10 CAPSULE, EXTENDED RELEASE ORAL DAILY
Qty: 30 CAPSULE | Refills: 0 | Status: SHIPPED | OUTPATIENT
Start: 2024-08-06 | End: 2024-09-05

## 2024-06-07 NOTE — TELEPHONE ENCOUNTER
Switch to 10 mg of Adderall as the patient wasn't able to get Methylphenidate short of supply at the pharmacy.     Shannan Barajas, APRN, CNP

## 2024-06-07 NOTE — ASSESSMENT & PLAN NOTE
Switch to 10 mg of Adderall as the patient wasn't able to get Methylphenidate short of supply at the pharmacy.

## 2024-07-29 NOTE — PROGRESS NOTES
"[facility]    HPI: Fela Solorzano is a 29 year old G0 who presents to clinic today for a repeat pap/HPV testing. She had a LEEP on 2/16/24. S/p HPV vaccination. She recently stopped taking her COCs stating she did not feel she needed them and has not had any new or worsening pelvic pain since.     Gyn History:   Menses: Has not had a menstrual cycle since placement of her IUD about 3 years ago. Has had no new vaginal bleeding or discharge.  STI Hx: Patient states she has no current symptoms or concerns for STI , she has been monogamous with her boyfriend of 2 years, however, she would like gonorrhea/chlamydia testing today  Contraception: Mirena IUD   Sexual activity: Currently sexually active with her boyfriend.   Pap smear history:   LEEP 2/16/24 CIN3 with negative margins  2/2/24 HSIL pap     PMH, PSH, Family history, Social history, medications and allergies were reviewed and updated in EMR.     Objective:   /82   Pulse 61   Ht 1.575 m (5' 2\")   Wt 85.5 kg (188 lb 9.6 oz)   BMI 34.50 kg/m    General: Healthy appearing. Alert, oriented. Affect is appropriate.   lymphadenopathy.   Heart: Regular rate    Lungs: Breathing is unlabored.   Pelvic: Normal external genitalia. Normal urethral meatus, perineum and anus. Moist, rugated vagina with scant clear discharge. Cervix  with slight area of leukoplakia on anterior cervix. Otherwise normal with well-healed LEEP. IUD strings visible in endocervix. Pap and Gc/Ch collected.     Assessment/Plan:   Fela Solorzano is a 29 year old G0 female with history of CIN3 here for a follow-up HPV/pap.     - discussed likely repeat pap/HPV in 6 months, if abnormal result manage per ASCCP guidelines   - s/p HPV vaccination     Rosa Mireles MD    "

## 2024-07-31 ENCOUNTER — OFFICE VISIT (OUTPATIENT)
Dept: OBGYN | Facility: CLINIC | Age: 29
End: 2024-07-31
Attending: STUDENT IN AN ORGANIZED HEALTH CARE EDUCATION/TRAINING PROGRAM
Payer: COMMERCIAL

## 2024-07-31 VITALS
DIASTOLIC BLOOD PRESSURE: 82 MMHG | HEART RATE: 61 BPM | BODY MASS INDEX: 34.71 KG/M2 | HEIGHT: 62 IN | SYSTOLIC BLOOD PRESSURE: 124 MMHG | WEIGHT: 188.6 LBS

## 2024-07-31 DIAGNOSIS — D06.9 CIN III (CERVICAL INTRAEPITHELIAL NEOPLASIA III): Primary | ICD-10-CM

## 2024-07-31 DIAGNOSIS — Z11.3 ROUTINE SCREENING FOR STI (SEXUALLY TRANSMITTED INFECTION): ICD-10-CM

## 2024-07-31 PROCEDURE — 99213 OFFICE O/P EST LOW 20 MIN: CPT | Mod: 25 | Performed by: STUDENT IN AN ORGANIZED HEALTH CARE EDUCATION/TRAINING PROGRAM

## 2024-07-31 PROCEDURE — 87491 CHLMYD TRACH DNA AMP PROBE: CPT | Performed by: STUDENT IN AN ORGANIZED HEALTH CARE EDUCATION/TRAINING PROGRAM

## 2024-07-31 PROCEDURE — 88175 CYTOPATH C/V AUTO FLUID REDO: CPT | Performed by: STUDENT IN AN ORGANIZED HEALTH CARE EDUCATION/TRAINING PROGRAM

## 2024-07-31 PROCEDURE — 99212 OFFICE O/P EST SF 10 MIN: CPT | Performed by: STUDENT IN AN ORGANIZED HEALTH CARE EDUCATION/TRAINING PROGRAM

## 2024-07-31 PROCEDURE — 87624 HPV HI-RISK TYP POOLED RSLT: CPT | Performed by: STUDENT IN AN ORGANIZED HEALTH CARE EDUCATION/TRAINING PROGRAM

## 2024-07-31 NOTE — PATIENT INSTRUCTIONS
Thank you for trusting us with your care!   Please be aware, if you are on Mychart, you may see your results prior to your providers review. If labs are abnormal, we will call or message you on [email or web address] ([email or web address])t with a follow up plan.    If you need to contact us for questions about:  Symptoms, Scheduling & Medical Questions; Non-urgent (2-3 day response) [employer] message, Urgent (needing response today) [phone] (if after 3:30pm next day response)   Prescriptions: Please call your Pharmacy   Billing: [facility] [phone] or [facility]:[phone]

## 2024-07-31 NOTE — NURSING NOTE
Chief Complaint   Patient presents with    Follow Up     6 month follow up from LEEP  PAP smear

## 2024-07-31 NOTE — LETTER
"7/31/2024       RE: Fela Solorzano  [address]  [place] [address]     Dear Colleague,    Thank you for referring your patient, Fela Solorzano, to the [facility]. Please see a copy of my visit note below.    [facility]    HPI: Fela Solorzano is a 29 year old G0 who presents to clinic today for a repeat pap/HPV testing. She had a LEEP on 2/16/24. S/p HPV vaccination. She recently stopped taking her COCs stating she did not feel she needed them and has not had any new or worsening pelvic pain since.     Gyn History:   Menses: Has not had a menstrual cycle since placement of her IUD about 3 years ago. Has had no new vaginal bleeding or discharge.  STI Hx: Patient states she has no current symptoms or concerns for STI , she has been monogamous with her boyfriend of 2 years, however, she would like gonorrhea/chlamydia testing today  Contraception: Mirena IUD   Sexual activity: Currently sexually active with her boyfriend.   Pap smear history:   LEEP 2/16/24 CIN3 with negative margins  2/2/24 HSIL pap     PMH, PSH, Family history, Social history, medications and allergies were reviewed and updated in EMR.     Objective:   /82   Pulse 61   Ht 1.575 m (5' 2\")   Wt 85.5 kg (188 lb 9.6 oz)   BMI 34.50 kg/m    General: Healthy appearing. Alert, oriented. Affect is appropriate.   lymphadenopathy.   Heart: Regular rate    Lungs: Breathing is unlabored.   Pelvic: Normal external genitalia. Normal urethral meatus, perineum and anus. Moist, rugated vagina with scant clear discharge. Cervix  with slight area of leukoplakia on anterior cervix. Otherwise normal with well-healed LEEP. IUD strings visible in endocervix. Pap and Gc/Ch collected.     Assessment/Plan:   Fela Solorzano is a 29 year old G0 female with history of CIN3 here for a follow-up HPV/pap.     - discussed likely " repeat pap/HPV in 6 months, if abnormal result manage per ASCCP guidelines   - s/p HPV vaccination     Rosa Mireles MD      Again, thank you for allowing me to participate in the care of your patient.      Sincerely,    Rosa Mireles MD

## 2024-08-01 LAB
C TRACH DNA SPEC QL PROBE+SIG AMP: NEGATIVE
N GONORRHOEA DNA SPEC QL NAA+PROBE: NEGATIVE

## 2024-08-02 LAB
HPV HR 12 DNA CVX QL NAA+PROBE: NEGATIVE
HPV16 DNA CVX QL NAA+PROBE: NEGATIVE
HPV18 DNA CVX QL NAA+PROBE: NEGATIVE
HUMAN PAPILLOMA VIRUS FINAL DIAGNOSIS: NORMAL

## 2024-08-06 LAB
BKR LAB AP GYN ADEQUACY: NORMAL
BKR LAB AP GYN INTERPRETATION: NORMAL
BKR LAB AP PREVIOUS ABNL DX: NORMAL
BKR LAB AP PREVIOUS ABNORMAL: NORMAL
PATH REPORT.COMMENTS IMP SPEC: NORMAL
PATH REPORT.COMMENTS IMP SPEC: NORMAL
PATH REPORT.RELEVANT HX SPEC: NORMAL

## 2024-08-07 ENCOUNTER — PATIENT OUTREACH (OUTPATIENT)
Dept: OBGYN | Facility: CLINIC | Age: 29
End: 2024-08-07
Payer: COMMERCIAL

## 2024-08-07 PROBLEM — D06.9 HIGH GRADE SQUAMOUS INTRAEPITHELIAL LESION (HGSIL), GRADE 3 CIN, ON BIOPSY OF CERVIX: Status: ACTIVE | Noted: 2024-02-08

## 2024-08-07 NOTE — TELEPHONE ENCOUNTER
7/31/24 NIL pap, Neg HPV. Plan cotest in 6 months per office visit note    *After excision or ablation for JAVIER 2+, patient needs a cotest in 6 months, then cotesting annually X 3, then a cotest every 3 years for at least 25 years.* (2019 ASCCP guideline).

## 2024-08-09 ENCOUNTER — VIRTUAL VISIT (OUTPATIENT)
Dept: FAMILY MEDICINE | Facility: CLINIC | Age: 29
End: 2024-08-09
Payer: COMMERCIAL

## 2024-08-09 DIAGNOSIS — F41.1 GENERALIZED ANXIETY DISORDER: Primary | ICD-10-CM

## 2024-08-09 PROCEDURE — 99213 OFFICE O/P EST LOW 20 MIN: CPT | Mod: 95 | Performed by: NURSE PRACTITIONER

## 2024-08-09 RX ORDER — BUSPIRONE HYDROCHLORIDE 5 MG/1
TABLET ORAL
Qty: 63 TABLET | Refills: 0 | Status: SHIPPED | OUTPATIENT
Start: 2024-08-09 | End: 2024-09-11

## 2024-08-09 ASSESSMENT — ENCOUNTER SYMPTOMS: NERVOUS/ANXIOUS: 1

## 2024-08-09 NOTE — PROGRESS NOTES
Fela is a 29 year old who is being evaluated via a billable video visit.    How would you like to obtain your AVS? MyChart  If the video visit is dropped, the invitation should be resent by: Text to cell phone: [phone]  Will anyone else be joining your video visit? No      Assessment & Plan   Problem List Items Addressed This Visit       Generalized anxiety disorder - Primary     Anxiety has worsened again. Is unhappy with her current work situation has negatively impacted her mental health. Has already decreased Bupropion to 150 mg which had helped but now anxiety is worse again. Would like to keep bupropion at 150 mg once daily for now. Will increase BuSpar to 15 mg in the morning and second dose initially 5 mg then ramp up to 10 or 15 mg every 3 days. Likely with a stopping point of 15 mg twice daily.     Recheck in 4-6 weeks         Relevant Medications    busPIRone (BUSPAR) 5 MG tablet             FUTURE APPOINTMENTS:       - Follow-up visit in 4-6 weeks      Subjective   Fela is a 29 year old, presenting for the following health issues:  Anxiety    Anxiety  This is a chronic problem. The current episode started more than 1 month ago. The problem occurs constantly.      Some tightness in the chest as in the past when anxiety worsens.      Increased anxiety, not doing well, teary. Thinks are worse at work, not a good work environment for her mental health. Increased anxiety while at work. While at home aftermath of the stress of work, is fatigue, apathetic, drained mentally emotionally drained. Daily    Saw her therapist last night, scores  Phq 14; now 7  GAD7 7 2023 and now is is 10    We had lowered buproprion to 150 because she felt that higher dose was causing the anxiety to be worse. Still feels the depression is controlled      Review of Systems  Constitutional, HEENT, cardiovascular, pulmonary, gi and gu systems are negative, except as otherwise noted.      Objective           Vitals:  No  vitals were obtained today due to virtual visit.    Physical Exam   GENERAL: alert and no distress  EYES: Eyes grossly normal to inspection.  No discharge or erythema, or obvious scleral/conjunctival abnormalities.  RESP: No audible wheeze, cough, or visible cyanosis.    SKIN: Visible skin clear. No significant rash, abnormal pigmentation or lesions.  NEURO: Cranial nerves grossly intact.  Mentation and speech appropriate for age.  PSYCH: Appropriate affect, tone, and pace of words          Video-Visit Details    Type of service:  Video Visit   Originating Location (pt. Location): Other work    Distant Location (provider location):  On-site  Platform used for Video Visit: Risa  Signed Electronically by: RASHEEDA Love CNP

## 2024-08-12 NOTE — ASSESSMENT & PLAN NOTE
Anxiety has worsened again. Is unhappy with her current work situation has negatively impacted her mental health. Has already decreased Bupropion to 150 mg which had helped but now anxiety is worse again. Would like to keep bupropion at 150 mg once daily for now. Will increase BuSpar to 15 mg in the morning and second dose initially 5 mg then ramp up to 10 or 15 mg every 3 days. Likely with a stopping point of 15 mg twice daily.     Recheck in 4-6 weeks

## 2024-08-29 ENCOUNTER — MYC MEDICAL ADVICE (OUTPATIENT)
Dept: FAMILY MEDICINE | Facility: CLINIC | Age: 29
End: 2024-08-29
Payer: COMMERCIAL

## 2024-09-11 ASSESSMENT — PATIENT HEALTH QUESTIONNAIRE - PHQ9
SUM OF ALL RESPONSES TO PHQ QUESTIONS 1-9: 4
10. IF YOU CHECKED OFF ANY PROBLEMS, HOW DIFFICULT HAVE THESE PROBLEMS MADE IT FOR YOU TO DO YOUR WORK, TAKE CARE OF THINGS AT HOME, OR GET ALONG WITH OTHER PEOPLE: SOMEWHAT DIFFICULT
SUM OF ALL RESPONSES TO PHQ QUESTIONS 1-9: 4

## 2024-09-11 ASSESSMENT — ANXIETY QUESTIONNAIRES
GAD7 TOTAL SCORE: 6
GAD7 TOTAL SCORE: 6
7. FEELING AFRAID AS IF SOMETHING AWFUL MIGHT HAPPEN: SEVERAL DAYS
GAD7 TOTAL SCORE: 6
8. IF YOU CHECKED OFF ANY PROBLEMS, HOW DIFFICULT HAVE THESE MADE IT FOR YOU TO DO YOUR WORK, TAKE CARE OF THINGS AT HOME, OR GET ALONG WITH OTHER PEOPLE?: SOMEWHAT DIFFICULT

## 2024-09-12 ENCOUNTER — OFFICE VISIT (OUTPATIENT)
Dept: FAMILY MEDICINE | Facility: CLINIC | Age: 29
End: 2024-09-12
Payer: COMMERCIAL

## 2024-09-12 VITALS
HEART RATE: 92 BPM | OXYGEN SATURATION: 97 % | WEIGHT: 195 LBS | DIASTOLIC BLOOD PRESSURE: 62 MMHG | HEIGHT: 62 IN | TEMPERATURE: 99.1 F | SYSTOLIC BLOOD PRESSURE: 110 MMHG | BODY MASS INDEX: 35.88 KG/M2 | RESPIRATION RATE: 16 BRPM

## 2024-09-12 DIAGNOSIS — F33.1 MODERATE EPISODE OF RECURRENT MAJOR DEPRESSIVE DISORDER (H): ICD-10-CM

## 2024-09-12 DIAGNOSIS — F41.1 GENERALIZED ANXIETY DISORDER: Primary | ICD-10-CM

## 2024-09-12 DIAGNOSIS — F90.0 ADHD (ATTENTION DEFICIT HYPERACTIVITY DISORDER), INATTENTIVE TYPE: ICD-10-CM

## 2024-09-12 PROCEDURE — 99213 OFFICE O/P EST LOW 20 MIN: CPT | Performed by: NURSE PRACTITIONER

## 2024-09-12 RX ORDER — DEXTROAMPHETAMINE SACCHARATE, AMPHETAMINE ASPARTATE MONOHYDRATE, DEXTROAMPHETAMINE SULFATE AND AMPHETAMINE SULFATE 2.5; 2.5; 2.5; 2.5 MG/1; MG/1; MG/1; MG/1
10 CAPSULE, EXTENDED RELEASE ORAL DAILY
Qty: 30 CAPSULE | Refills: 0 | Status: SHIPPED | OUTPATIENT
Start: 2024-09-12 | End: 2024-10-12

## 2024-09-12 RX ORDER — DEXTROAMPHETAMINE SACCHARATE, AMPHETAMINE ASPARTATE MONOHYDRATE, DEXTROAMPHETAMINE SULFATE AND AMPHETAMINE SULFATE 2.5; 2.5; 2.5; 2.5 MG/1; MG/1; MG/1; MG/1
10 CAPSULE, EXTENDED RELEASE ORAL DAILY
Qty: 30 CAPSULE | Refills: 0 | Status: SHIPPED | OUTPATIENT
Start: 2024-11-11 | End: 2024-12-11

## 2024-09-12 RX ORDER — DEXTROAMPHETAMINE SACCHARATE, AMPHETAMINE ASPARTATE MONOHYDRATE, DEXTROAMPHETAMINE SULFATE AND AMPHETAMINE SULFATE 2.5; 2.5; 2.5; 2.5 MG/1; MG/1; MG/1; MG/1
10 CAPSULE, EXTENDED RELEASE ORAL DAILY
Qty: 30 CAPSULE | Refills: 0 | Status: SHIPPED | OUTPATIENT
Start: 2024-10-12 | End: 2024-11-11

## 2024-09-12 RX ORDER — BUPROPION HYDROCHLORIDE 150 MG/1
150 TABLET ORAL EVERY MORNING
Qty: 90 TABLET | Refills: 3 | Status: SHIPPED | OUTPATIENT
Start: 2024-09-12

## 2024-09-12 RX ORDER — BUSPIRONE HYDROCHLORIDE 15 MG/1
15 TABLET ORAL 2 TIMES DAILY
Qty: 180 TABLET | Refills: 3 | Status: SHIPPED | OUTPATIENT
Start: 2024-09-12

## 2024-09-12 RX ORDER — DEXTROAMPHETAMINE SACCHARATE, AMPHETAMINE ASPARTATE MONOHYDRATE, DEXTROAMPHETAMINE SULFATE AND AMPHETAMINE SULFATE 2.5; 2.5; 2.5; 2.5 MG/1; MG/1; MG/1; MG/1
10 CAPSULE, EXTENDED RELEASE ORAL DAILY
COMMUNITY

## 2024-09-12 ASSESSMENT — ENCOUNTER SYMPTOMS: NERVOUS/ANXIOUS: 1

## 2024-09-12 ASSESSMENT — PAIN SCALES - GENERAL: PAINLEVEL: NO PAIN (0)

## 2024-09-12 NOTE — ASSESSMENT & PLAN NOTE
Medication: Adderall 10 mg XR works better, will give three 1 month prescriptions for her to take with her to [facility].  While she is out there if she needs another prescription she can let me know via [employer] and I would send it to a pharmacy there.  Recommend that she come back into clinic by March so we could update her CSA at that time.

## 2024-09-12 NOTE — ASSESSMENT & PLAN NOTE
PHQ9: 4  GAD7: 6   BuSpar 15 mg twice daily, refills given for 1 year  Bupropion 150 mg, refills given for 1 year.   Much more controlled right now.

## 2024-09-12 NOTE — ASSESSMENT & PLAN NOTE
Wt Readings from Last 4 Encounters:   09/12/24 88.5 kg (195 lb)   07/31/24 85.5 kg (188 lb 9.6 oz)   03/27/24 85.7 kg (189 lb)   03/01/24 89.9 kg (198 lb 3.2 oz)     Weight has increased again.   Initial BMI 36 current BMI 34   Goal 170-180     Phenotype, increased stress, vegetarian diet  Comorbid conditions endometriosis, GERD, IBS       Homeostatic Model Assessment of Insulin Resistance  Vivi-IR: Insulin mU/L 10 x  Glucose mg/dL 83  = 2.0  Reference ranges:  Healthy Range: 1.0 (0.5-1.4)  Less than 1.0 means you are insulin-sensitive which is optimal  Above 1.9 indicates early insulin resistance  Above 2.9 indicates significant insulin resistance     Fasting insulin 10, hemoglobin A1c 5.4 she may have very very early insulin resistance.  Nutrition: Discussed trying to prioritize plant-based protein sources and keeping the amount of protein that she is eating high.  Exercise: With [place] out to [place] she plans on doing more exercise and being more accountable.  She will really continue to work on losing weight while she is there.

## 2024-09-12 NOTE — PROGRESS NOTES
"  Assessment & Plan   Problem List Items Addressed This Visit       ADHD (attention deficit hyperactivity disorder), inattentive type     Medication: Adderall 10 mg XR works better, will give three 1 month prescriptions for her to take with her to [facility].  While she is out there if she needs another prescription she can let me know via [employer] and I would send it to a pharmacy there.  Recommend that she come back into clinic by March so we could update her CSA at that time.         Relevant Medications    amphetamine-dextroamphetamine (ADDERALL XR) 10 MG 24 hr capsule    amphetamine-dextroamphetamine (ADDERALL XR) 10 MG 24 hr capsule    amphetamine-dextroamphetamine (ADDERALL XR) 10 MG 24 hr capsule (Start on 10/12/2024)    amphetamine-dextroamphetamine (ADDERALL XR) 10 MG 24 hr capsule (Start on 11/11/2024)    Generalized anxiety disorder - Primary     PHQ9: 4  GAD7: 6   BuSpar 15 mg twice daily, refills given for 1 year  Bupropion 150 mg, refills given for 1 year.   Much more controlled right now.              Relevant Medications    busPIRone (BUSPAR) 15 MG tablet    buPROPion (WELLBUTRIN XL) 150 MG 24 hr tablet    Moderate episode of recurrent major depressive disorder (H)     PHQ9: 4  GAD7: 6    Medication: Bupropion 150 mg once daily refills given for 1 year.           Relevant Medications    busPIRone (BUSPAR) 15 MG tablet    buPROPion (WELLBUTRIN XL) 150 MG 24 hr tablet             BMI  Estimated body mass index is 35.67 kg/m  as calculated from the following:    Height as of this encounter: 1.575 m (5' 2\").    Weight as of this encounter: 88.5 kg (195 lb).   Weight management plan: see above    FUTURE APPOINTMENTS:       - Follow-up for annual visit or as needed      Lizzie Chahal is a 29 year old, presenting for the following health issues:  Anxiety    History of Present Illness       Mental Health Follow-up:  Patient presents to follow-up on Anxiety.    Patient's anxiety since last visit has " "been:  Better  The patient is having other symptoms associated with anxiety.  Any significant life events: job concerns  Patient is not feeling anxious or having panic attacks.  Patient has no concerns about alcohol or drug use.    Reason for visit:  Anxiety follow up, medication refill    She eats 2-3 servings of fruits and vegetables daily.She consumes 0 sweetened beverage(s) daily.She exercises with enough effort to increase her heart rate 30 to 60 minutes per day.  She exercises with enough effort to increase her heart rate 4 days per week.   She is taking medications regularly.       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  But her first contract is in [facility].  She is moving out there at the end of September.  She feels that this is a much better job situation for her she felt that her anxiety improved after she increased the BuSpar and switched up her job.  BuSpar 15 mg in the morning and taking 10 mg at night but she is open to increasing it to 15 mg twice daily.  Would like to keep bupropion as it is.  Anxiety felt really irrational like she was in a large amount of distress especially when she was a BuSpar dose.  ADHD: We have switched her from methylphenidate to Adderall due to a shortage however she has found that she likes the Adderall better.  10 mg is all she needs for the whole day.  She does not need the smaller IR dose in the afternoon.  Fatigue and attention is much improved.      Review of Systems  Constitutional, HEENT, cardiovascular, pulmonary, gi and gu systems are negative, except as otherwise noted.      Objective    /62 (BP Location: Right arm, Patient Position: Sitting, Cuff Size: Adult Large)   Pulse 92   Temp 99.1  F (37.3  C) (Tympanic)   Resp 16   Ht 1.575 m (5' 2\")   Wt 88.5 kg (195 lb)   LMP  (LMP Unknown)   SpO2 97%   BMI 35.67 kg/m    Body mass index is 35.67 " kg/m .  Physical Exam  Constitutional:       Appearance: Normal appearance.   HENT:      Head: Normocephalic.   Pulmonary:      Effort: Pulmonary effort is normal. No respiratory distress.   Skin:     General: Skin is warm and dry.   Neurological:      Mental Status: She is alert and oriented to person, place, and time.   Psychiatric:         Mood and Affect: Mood normal.         Behavior: Behavior normal.         Thought Content: Thought content normal.         Judgment: Judgment normal.                    Signed Electronically by: RASHEEDA Love CNP

## 2024-09-17 DIAGNOSIS — K21.00 GASTROESOPHAGEAL REFLUX DISEASE WITH ESOPHAGITIS WITHOUT HEMORRHAGE: ICD-10-CM

## 2024-09-17 RX ORDER — FAMOTIDINE 20 MG/1
20 TABLET, FILM COATED ORAL DAILY
Qty: 90 TABLET | Refills: 1 | Status: SHIPPED | OUTPATIENT
Start: 2024-09-17

## 2024-09-19 ENCOUNTER — PATIENT OUTREACH (OUTPATIENT)
Dept: CARE COORDINATION | Facility: CLINIC | Age: 29
End: 2024-09-19
Payer: COMMERCIAL

## 2024-09-25 NOTE — PROGRESS NOTES
"  Assessment & Plan     Fela was seen today for abscess.    Diagnoses and all orders for this visit:    Abscess of chest (H)    Site that opened up yesterday is closed and no longer draining.  It did feel as though there was some residual fluctuance so I&D was done today with only bloody drainage, no purulence.  Patient instructed on warm packs and to finish antibiotics, ibuprofen as needed.  It think it is fine for her to have gyn surgery next week as long as this is improving.           BMI  Estimated body mass index is 35.12 kg/m  as calculated from the following:    Height as of this encounter: 1.575 m (5' 2\").    Weight as of this encounter: 87.1 kg (192 lb).           Subjective   Fela is a 28 year old, presenting for the following health issues:  Abscess        1/23/2024     1:11 PM   Additional Questions   Roomed by parisa   Accompanied by self     History of Present Illness       Reason for visit:  Abscess    She eats 2-3 servings of fruits and vegetables daily.She consumes 0 sweetened beverage(s) daily.She exercises with enough effort to increase her heart rate 30 to 60 minutes per day.  She exercises with enough effort to increase her heart rate 4 days per week.   She is taking medications regularly.     Follow-up abscess on chest.   Started Bactrim last week.  At that time I&D was not recommended.   Yesterday it started draining pus.   She is supposed to have surgery tomorrow, she rescheduled it for next week.   She is wondering if abscess needs to be drained.   Denies any fever or malaise.          Review of Systems  Constitutional, HEENT, cardiovascular, pulmonary, gi and gu systems are negative, except as otherwise noted.      Objective    /77   Pulse 81   Temp 98.6  F (37  C)   Ht 1.575 m (5' 2\")   Wt 87.1 kg (192 lb)   SpO2 100%   BMI 35.12 kg/m    Body mass index is 35.12 kg/m .  Physical Exam   GENERAL: alert and no distress  RESP: lungs clear to auscultation - no rales, rhonchi or " wheezes  SKIN:  abscess between breasts- quarter-sized erythematous abscess, slightly fluctuant, small scabbed area where it had been previously draining.    NEURO: Normal strength and tone, mentation intact and speech normal  PSYCH: mentation appears normal, affect normal/bright    I&D-  Risks and benefits discussed, patient agrees.   Patient opted for topical cream anesthetic vs lidocaine injection.   Cleansed with betadine.  Stab incision made with #11 blade centrally at area where it felt most fluctuant, minimal bloody drainage, no purulence.  Site re-cleansed and another incision made with new blade along left lateral aspect where it had been previously draining and still no purulent drainage noted.   Patient tolerated with no complications.  Covered with gauze and tape.         Signed Electronically by: Jessica Verdugo CNP     done

## 2024-11-12 DIAGNOSIS — G43.709 CHRONIC MIGRAINE WITHOUT AURA WITHOUT STATUS MIGRAINOSUS, NOT INTRACTABLE: ICD-10-CM

## 2024-11-12 DIAGNOSIS — F41.1 GENERALIZED ANXIETY DISORDER: ICD-10-CM

## 2024-11-15 RX ORDER — BUPROPION HYDROCHLORIDE 150 MG/1
150 TABLET ORAL EVERY MORNING
Qty: 90 TABLET | Refills: 2 | Status: SHIPPED | OUTPATIENT
Start: 2024-11-15

## 2024-11-15 RX ORDER — BUSPIRONE HYDROCHLORIDE 15 MG/1
15 TABLET ORAL 2 TIMES DAILY
Qty: 180 TABLET | Refills: 2 | Status: SHIPPED | OUTPATIENT
Start: 2024-11-15

## 2024-11-15 RX ORDER — DULOXETIN HYDROCHLORIDE 20 MG/1
CAPSULE, DELAYED RELEASE ORAL
Refills: 0 | OUTPATIENT
Start: 2024-11-15

## 2024-11-15 RX ORDER — BUSPIRONE HYDROCHLORIDE 15 MG/1
TABLET ORAL
Refills: 0 | OUTPATIENT
Start: 2024-11-15

## 2024-11-15 RX ORDER — DULOXETIN HYDROCHLORIDE 20 MG/1
20 CAPSULE, DELAYED RELEASE ORAL DAILY
Qty: 90 CAPSULE | Refills: 2 | Status: SHIPPED | OUTPATIENT
Start: 2024-11-15

## 2024-11-15 RX ORDER — TOPIRAMATE 50 MG/1
50 TABLET, FILM COATED ORAL DAILY
Qty: 90 TABLET | Refills: 2 | Status: SHIPPED | OUTPATIENT
Start: 2024-11-15

## 2024-11-15 RX ORDER — BUPROPION HYDROCHLORIDE 150 MG/1
TABLET ORAL
Refills: 0 | OUTPATIENT
Start: 2024-11-15

## 2024-11-15 RX ORDER — TOPIRAMATE 50 MG/1
TABLET, FILM COATED ORAL
Refills: 0 | OUTPATIENT
Start: 2024-11-15

## 2024-11-30 ENCOUNTER — MYC REFILL (OUTPATIENT)
Dept: FAMILY MEDICINE | Facility: CLINIC | Age: 29
End: 2024-11-30
Payer: COMMERCIAL

## 2024-11-30 DIAGNOSIS — F90.0 ADHD (ATTENTION DEFICIT HYPERACTIVITY DISORDER), INATTENTIVE TYPE: Primary | ICD-10-CM

## 2024-12-02 RX ORDER — DEXTROAMPHETAMINE SACCHARATE, AMPHETAMINE ASPARTATE MONOHYDRATE, DEXTROAMPHETAMINE SULFATE AND AMPHETAMINE SULFATE 2.5; 2.5; 2.5; 2.5 MG/1; MG/1; MG/1; MG/1
10 CAPSULE, EXTENDED RELEASE ORAL DAILY
Qty: 30 CAPSULE | Refills: 0 | Status: SHIPPED | OUTPATIENT
Start: 2024-12-02

## 2025-01-14 ENCOUNTER — PATIENT OUTREACH (OUTPATIENT)
Dept: OBGYN | Facility: CLINIC | Age: 30
End: 2025-01-14
Payer: COMMERCIAL

## 2025-01-14 PROBLEM — D06.9 HIGH GRADE SQUAMOUS INTRAEPITHELIAL LESION (HGSIL), GRADE 3 CIN, ON BIOPSY OF CERVIX: Status: ACTIVE | Noted: 2024-02-08

## 2025-01-23 ENCOUNTER — MYC REFILL (OUTPATIENT)
Dept: FAMILY MEDICINE | Facility: CLINIC | Age: 30
End: 2025-01-23
Payer: COMMERCIAL

## 2025-01-23 DIAGNOSIS — G43.709 CHRONIC MIGRAINE WITHOUT AURA WITHOUT STATUS MIGRAINOSUS, NOT INTRACTABLE: ICD-10-CM

## 2025-01-23 DIAGNOSIS — F90.0 ADHD (ATTENTION DEFICIT HYPERACTIVITY DISORDER), INATTENTIVE TYPE: ICD-10-CM

## 2025-01-23 RX ORDER — RIZATRIPTAN BENZOATE 10 MG/1
10 TABLET, ORALLY DISINTEGRATING ORAL
Qty: 30 TABLET | Refills: 3 | Status: SHIPPED | OUTPATIENT
Start: 2025-01-23

## 2025-01-23 RX ORDER — DEXTROAMPHETAMINE SACCHARATE, AMPHETAMINE ASPARTATE MONOHYDRATE, DEXTROAMPHETAMINE SULFATE AND AMPHETAMINE SULFATE 2.5; 2.5; 2.5; 2.5 MG/1; MG/1; MG/1; MG/1
10 CAPSULE, EXTENDED RELEASE ORAL DAILY
Qty: 30 CAPSULE | Refills: 0 | Status: SHIPPED | OUTPATIENT
Start: 2025-01-23

## 2025-01-30 ENCOUNTER — TELEPHONE (OUTPATIENT)
Dept: FAMILY MEDICINE | Facility: CLINIC | Age: 30
End: 2025-01-30
Payer: COMMERCIAL

## 2025-01-30 NOTE — TELEPHONE ENCOUNTER
rizatriptan (MAXALT-MLT) 10 MG ODT30 [id number]/23/2025--NoSig - Route: Take 1 tablet (10 mg) by mouth at onset of headache for migraine. - OralSent to pharmacy as: Rizatriptan Benzoate 10 MG Oral Tablet Disintegrating (MAXALT-MLT)Class: E-PrescribeOrder: [id number] Status: Receipt confirmed by pharmacy (1/23/2025  3:50 PM CST)   Chronic migraine without aura without status migrainosus, not intractable [G43.709]     Please initiate PA. Thanks

## 2025-02-03 ENCOUNTER — TELEPHONE (OUTPATIENT)
Dept: FAMILY MEDICINE | Facility: CLINIC | Age: 30
End: 2025-02-03
Payer: COMMERCIAL

## 2025-02-03 NOTE — TELEPHONE ENCOUNTER
Incoming fax from pharmacy: Prior Authorization needed for:    rizatriptan (MAXALT-MLT) 10 MG ODT 30 tablet 3 1/23/2025 -- No   Sig - Route: Take 1 tablet (10 mg) by mouth at onset of headache for migraine. - Oral   Sent to pharmacy as: Rizatriptan Benzoate 10 MG Oral Tablet Disintegrating (MAXALT-MLT)   Class: E-Prescribe   Order: [id number]

## 2025-02-04 NOTE — TELEPHONE ENCOUNTER
PA Initiation    Medication: RIZATRIPTAN BENZOATE 10 MG PO TBDP  Insurance Company: [employer] - Phone [phone] Fax [phone]  Pharmacy Filling the Rx: [facility] - [facility] - ONE [facility] AT PORTAL TO REGISTERED [facility] SITES  Filling Pharmacy Phone: [phone]  Filling Pharmacy Fax:    Start Date: 2/4/2025

## 2025-02-06 NOTE — TELEPHONE ENCOUNTER
PA Initiation    Medication: RIZATRIPTAN BENZOATE 10 MG PO TBDP  Insurance Company: "[employer] - Phone [phone] Fax [phone]  Pharmacy Filling the Rx: [facility] - [place] [facility] - ONE [facility] AT PORTAL TO REGISTERED [facility] SITES  Filling Pharmacy Phone: [phone]  Filling Pharmacy Fax: [phone]  Start Date: 2/6/2025

## 2025-02-10 NOTE — TELEPHONE ENCOUNTER
Prior Authorization Not Needed per Insurance    Medication: RIZATRIPTAN BENZOATE 10 MG PO TBDP  Insurance Company: [employer] - Phone [phone] Fax [phone]  Expected CoPay: $    Pharmacy Filling the Rx: [facility] - [facility] - ONE [facility] AT PORTAL TO [facility]  Pharmacy Notified: YES  Patient Notified: Instructed pharmacy to notify patient once order is ready.

## 2025-02-10 NOTE — TELEPHONE ENCOUNTER
Prior Authorization Not Needed per Insurance    Medication: RIZATRIPTAN BENZOATE 10 MG PO TBDP  Insurance Company: [employer] - Phone [phone] Fax [phone]  Expected CoPay: $    Pharmacy Filling the Rx: [facility] - [place]Y - ONE [facility] AT PORTAL TO [facility]  Pharmacy Notified: YES  Patient Notified: YES

## 2025-02-18 ENCOUNTER — MYC REFILL (OUTPATIENT)
Dept: FAMILY MEDICINE | Facility: CLINIC | Age: 30
End: 2025-02-18
Payer: COMMERCIAL

## 2025-02-18 DIAGNOSIS — F90.0 ADHD (ATTENTION DEFICIT HYPERACTIVITY DISORDER), INATTENTIVE TYPE: ICD-10-CM

## 2025-02-18 RX ORDER — DEXTROAMPHETAMINE SACCHARATE, AMPHETAMINE ASPARTATE MONOHYDRATE, DEXTROAMPHETAMINE SULFATE AND AMPHETAMINE SULFATE 2.5; 2.5; 2.5; 2.5 MG/1; MG/1; MG/1; MG/1
10 CAPSULE, EXTENDED RELEASE ORAL DAILY
Qty: 30 CAPSULE | Refills: 0 | Status: SHIPPED | OUTPATIENT
Start: 2025-02-18

## 2025-02-18 NOTE — TELEPHONE ENCOUNTER
Patient sending refill request early due to turnaround time of mail order pharmacy.    See 02/18 [facility] for this request.    Richar JALLOH RN  [facility]

## 2025-05-19 NOTE — PATIENT INSTRUCTIONS
Thank you for trusting us with your care!     If you need to contact us for questions about:  Symptoms, Scheduling & Medical Questions; Non-urgent (2-3 day response) Damien message, Urgent (needing response today) [phone] (if after 3:30pm next day response)   Prescriptions: Please call your Pharmacy   Billing: [facility] [phone] or [facility]:[phone]    Detail Level: Simple Include Location In Plan?: Yes Hide Include Location In Plan Question?: No Detail Level: Generalized

## (undated) DEVICE — Device

## (undated) DEVICE — DRAPE GYN/UROLOGY FLUID POUCH TUR 29455

## (undated) DEVICE — ESU ELEC LEEP LOOP 10X10MM YELLOW DLP-S11

## (undated) DEVICE — DEVICE SUTURE PASSER 14GA WECK EFX EFXSP2

## (undated) DEVICE — DRAPE UNDER BUTTOCK 8483

## (undated) DEVICE — KIT PATIENT POSITIONING PIGAZZI LATEX FREE 40580

## (undated) DEVICE — SOL NACL 0.9% IRRIG 500ML BOTTLE 2F7123

## (undated) DEVICE — TUBING SUCTION 12"X1/4" N612

## (undated) DEVICE — SPECIMEN CONTAINER 60MLW/10% FORMALIN 59601

## (undated) DEVICE — ENDO TROCAR FIRST ENTRY KII FIOS ADV FIX 05X100MM CFF03

## (undated) DEVICE — ESU GROUND PAD ADULT W/CORD E7507

## (undated) DEVICE — SWAB PROCTO 16" 2/PK 32-046

## (undated) DEVICE — ESU PENCIL SMOKE EVAC W/ROCKER SWITCH 0703-047-000

## (undated) DEVICE — PREP CHLORAPREP 26ML TINTED ORANGE  260815

## (undated) DEVICE — DRSG TELFA 3X8" 1238

## (undated) DEVICE — KIT SUREPATH 10ML  COLLECTION VIAL 1305190

## (undated) DEVICE — SU MONOCRYL 4-0 PS-2 27" UND Y426H

## (undated) DEVICE — SUCTION MANIFOLD NEPTUNE 2 SYS 4 PORT 0702-020-000

## (undated) DEVICE — NDL SPINAL 22GA 3.5" QUINCKE 405181

## (undated) DEVICE — LINEN TOWEL PACK X5 5464

## (undated) DEVICE — GLOVE PROTEXIS POWDER FREE SMT 6.5  2D72PT65X

## (undated) DEVICE — SOL WATER IRRIG 500ML BOTTLE 2F7113

## (undated) DEVICE — GLOVE BIOGEL PI MICRO INDICATOR UNDERGLOVE SZ 7.5 48975

## (undated) DEVICE — SU SILK 2-0 SH 30" K833H

## (undated) DEVICE — ENDO TROCAR SLEEVE KII ADV FIXATION 05X100MM CFS02

## (undated) DEVICE — GLOVE BIOGEL PI MICRO SZ 7.0 48570

## (undated) DEVICE — BRUSH CYTOLOGY MULTILINGUAL ROVERS CERVEX 491461

## (undated) DEVICE — GLOVE BIOGEL PI MICRO INDICATOR UNDERGLOVE SZ 7.0 48970

## (undated) DEVICE — APPLICATOR COTTON TIP 6"X2 STERILE LF 6012

## (undated) RX ORDER — ONDANSETRON 2 MG/ML
INJECTION INTRAMUSCULAR; INTRAVENOUS
Status: DISPENSED
Start: 2024-02-02

## (undated) RX ORDER — OXYCODONE HYDROCHLORIDE 5 MG/1
TABLET ORAL
Status: DISPENSED
Start: 2024-02-16

## (undated) RX ORDER — GLYCOPYRROLATE 0.2 MG/ML
INJECTION INTRAMUSCULAR; INTRAVENOUS
Status: DISPENSED
Start: 2024-02-02

## (undated) RX ORDER — FERRIC SUBSULFATE 0.21 G/G
LIQUID TOPICAL
Status: DISPENSED
Start: 2024-02-16

## (undated) RX ORDER — KETOROLAC TROMETHAMINE 30 MG/ML
INJECTION, SOLUTION INTRAMUSCULAR; INTRAVENOUS
Status: DISPENSED
Start: 2024-02-02

## (undated) RX ORDER — FENTANYL CITRATE 50 UG/ML
INJECTION, SOLUTION INTRAMUSCULAR; INTRAVENOUS
Status: DISPENSED
Start: 2024-02-02

## (undated) RX ORDER — IODINE AND POTASSIUM IODIDE 50; 100 MG/ML; MG/ML
LIQUID ORAL
Status: DISPENSED
Start: 2024-02-16

## (undated) RX ORDER — PROPOFOL 10 MG/ML
INJECTION, EMULSION INTRAVENOUS
Status: DISPENSED
Start: 2024-02-02

## (undated) RX ORDER — ACETAMINOPHEN 325 MG/1
TABLET ORAL
Status: DISPENSED
Start: 2024-02-02

## (undated) RX ORDER — DEXAMETHASONE SODIUM PHOSPHATE 4 MG/ML
INJECTION, SOLUTION INTRA-ARTICULAR; INTRALESIONAL; INTRAMUSCULAR; INTRAVENOUS; SOFT TISSUE
Status: DISPENSED
Start: 2024-02-02

## (undated) RX ORDER — BUPIVACAINE HYDROCHLORIDE 2.5 MG/ML
INJECTION, SOLUTION EPIDURAL; INFILTRATION; INTRACAUDAL
Status: DISPENSED
Start: 2024-02-02

## (undated) RX ORDER — ACETIC ACID 5 %
LIQUID (ML) MISCELLANEOUS
Status: DISPENSED
Start: 2024-02-16

## (undated) RX ORDER — LIDOCAINE HYDROCHLORIDE AND EPINEPHRINE 10; 10 MG/ML; UG/ML
INJECTION, SOLUTION INFILTRATION; PERINEURAL
Status: DISPENSED
Start: 2024-02-16

## (undated) RX ORDER — FENTANYL CITRATE 50 UG/ML
INJECTION, SOLUTION INTRAMUSCULAR; INTRAVENOUS
Status: DISPENSED
Start: 2024-02-16